# Patient Record
Sex: FEMALE | URBAN - METROPOLITAN AREA
[De-identification: names, ages, dates, MRNs, and addresses within clinical notes are randomized per-mention and may not be internally consistent; named-entity substitution may affect disease eponyms.]

---

## 2018-08-06 ENCOUNTER — COMMUNICATION - HEALTHEAST (OUTPATIENT)
Dept: OTHER | Facility: CLINIC | Age: 28
End: 2018-08-06

## 2019-10-21 ENCOUNTER — MEDICAL CORRESPONDENCE (OUTPATIENT)
Dept: HEALTH INFORMATION MANAGEMENT | Facility: CLINIC | Age: 29
End: 2019-10-21

## 2019-10-23 ENCOUNTER — TRANSFERRED RECORDS (OUTPATIENT)
Dept: HEALTH INFORMATION MANAGEMENT | Facility: CLINIC | Age: 29
End: 2019-10-23

## 2019-10-28 ENCOUNTER — HOSPITAL ENCOUNTER (OUTPATIENT)
Dept: CARDIOLOGY | Facility: CLINIC | Age: 29
Discharge: HOME | End: 2019-10-28
Admitting: PEDIATRICS
Payer: COMMERCIAL

## 2019-10-28 DIAGNOSIS — O26.90 PREGNANCY RELATED CONDITION, ANTEPARTUM: ICD-10-CM

## 2019-10-28 PROCEDURE — 76825 ECHO EXAM OF FETAL HEART: CPT

## 2019-10-28 NOTE — PROGRESS NOTES
Fetal Cardiology Consultation    Patient:  Brendan Magaña MRN:  [id number]   YOB: 1990 Age:  29 year old   Date of Visit:  10/28/2019 PCP:  Jaylyn Meeks, Physician   LATIA: 19 EGA: 35+1 weeks     Dear Doctor:    I had the pleasure of seeing Brendan Magaña at the [facility] in [place] on 10/28/2019 in consultation for fetal echocardiography results. She presented today by herself. As you know, she is a 29 year old female with suspected fetal cardiac anomaly on obstetrical ultrasound.    The fetal echocardiogram was normal. Normal fetal cardiac anatomy. Normal right and left ventricular size and function without hypertrophy. No evidence of diastolic dysfunction. No pericardial effusion. No arrhythmia.     I reviewed and interpreted the fetal echocardiogram today. I discussed the normal results with Ms. Magaña. While these results are normal, it is important to note that fetal echocardiography cannot exclude small atrial or ventricular septal defects, persistent ductus arteriosus, mild coarctation of the aorta, partial anomalous pulmonary venous return, minor anatomic valve anomalies, or coronary artery anomalies.     -- No additional fetal echocardiograms are recommended for this pregnancy.  -- A post-katherine transthoracic echocardiogram is recommended to exclude a subtle VSD.    Thank you for allowing me to participate in Ms. Magaña's care. Please don't hesitate to contact me or the Fetal Cardiology team at [facility] with any questions or concerns.     I spent a total of 10 minutes face-to-face with Ms. Magaña during today's office visit. Over 50% of this time was spent counseling the patient and/or coordinating care regarding the fetal echocardiography results.     Marc Payne MD  Pediatric Cardiology  [facility]  Phone [phone]

## 2020-01-28 ENCOUNTER — TRANSFERRED RECORDS (OUTPATIENT)
Dept: HEALTH INFORMATION MANAGEMENT | Facility: CLINIC | Age: 30
End: 2020-01-28

## 2020-01-28 ENCOUNTER — TRANSCRIBE ORDERS (OUTPATIENT)
Dept: OTHER | Age: 30
End: 2020-01-28

## 2020-01-28 ENCOUNTER — TELEPHONE (OUTPATIENT)
Dept: DERMATOLOGY | Facility: CLINIC | Age: 30
End: 2020-01-28

## 2020-01-28 DIAGNOSIS — L73.2 HIDRADENITIS SUPPURATIVA: Primary | ICD-10-CM

## 2020-01-28 NOTE — TELEPHONE ENCOUNTER
[facility] Call Center    Phone Message    May a detailed message be left on voicemail: yes    Reason for Call: Other: Pt was referred to see Dr Xiao for Hidradenitis Suppurativa but the earliest appt is on 4/30.  Please assist Pt to check for earlier date.     Action Taken: Message routed to:  [facility] ([facility] dermatology

## 2020-01-29 NOTE — TELEPHONE ENCOUNTER
Called pt and LVM. I wanted to let her know that the 4/30/20 appointment with Dr. Xiao is the soonest available with him. I wanted to see if she wanted to see another one of our providers in the meantime. Clinic number provided.  CYNTHIA Núñez

## 2020-01-31 NOTE — TELEPHONE ENCOUNTER
FUTURE VISIT INFORMATION      FUTURE VISIT INFORMATION:    Date: 4.30.20    Time: 2:45    Location:  Derm  REFERRAL INFORMATION:    Referring provider:  Dr. Mian Ruiz    Referring providers clinic:  [facility]    Reason for visit/diagnosis  new Pt referred by  Mian Ruiz MD for Hidradenitis suppurativa    RECORDS REQUESTED FROM:       Clinic name Comments Records Status Imaging Status   [facility] 1.28.20 Dr. Ruiz   Requested          [facility] 12.23.19 Dr. Cierra Sage Epic/CE N/A                       Action 3.27.20   Action Taken Faxed records request to [facility] at [phone]

## 2020-02-03 NOTE — TELEPHONE ENCOUNTER
I called the patient and I left a message asking that she call back.   [facility]

## 2020-04-27 ENCOUNTER — MYC MEDICAL ADVICE (OUTPATIENT)
Dept: DERMATOLOGY | Facility: CLINIC | Age: 30
End: 2020-04-27

## 2020-04-27 NOTE — TELEPHONE ENCOUNTER
Called to set up [employer] for video visit. Pt accepted but still needs to logon to [employer]. Pt will do this and we will send the video visit information:     Thank you for scheduling your visit as a Video Visit! To receive an invitation and connect with your provider, the Native video visit technology must be accessible from your smartphone or personal device. Please click the link below for setup instructions:    [email or web address]    Pt will call back with concerns.

## 2020-04-30 ENCOUNTER — PRE VISIT (OUTPATIENT)
Dept: DERMATOLOGY | Facility: CLINIC | Age: 30
End: 2020-04-30

## 2020-12-20 ENCOUNTER — HEALTH MAINTENANCE LETTER (OUTPATIENT)
Age: 30
End: 2020-12-20

## 2021-10-03 ENCOUNTER — HEALTH MAINTENANCE LETTER (OUTPATIENT)
Age: 31
End: 2021-10-03

## 2023-11-20 ENCOUNTER — TRANSCRIBE ORDERS (OUTPATIENT)
Dept: OTHER | Age: 33
End: 2023-11-20

## 2023-11-20 DIAGNOSIS — L73.2 HIDRADENITIS SUPPURATIVA: Primary | ICD-10-CM

## 2023-12-10 ENCOUNTER — HEALTH MAINTENANCE LETTER (OUTPATIENT)
Age: 33
End: 2023-12-10

## 2024-03-28 ENCOUNTER — OFFICE VISIT (OUTPATIENT)
Dept: DERMATOLOGY | Facility: CLINIC | Age: 34
End: 2024-03-28
Attending: DERMATOLOGY
Payer: COMMERCIAL

## 2024-03-28 ENCOUNTER — LAB (OUTPATIENT)
Dept: LAB | Facility: CLINIC | Age: 34
End: 2024-03-28
Payer: COMMERCIAL

## 2024-03-28 VITALS
TEMPERATURE: 98.1 F | HEART RATE: 94 BPM | DIASTOLIC BLOOD PRESSURE: 72 MMHG | OXYGEN SATURATION: 98 % | SYSTOLIC BLOOD PRESSURE: 106 MMHG | WEIGHT: 220.1 LBS

## 2024-03-28 DIAGNOSIS — L73.2 HIDRADENITIS SUPPURATIVA: ICD-10-CM

## 2024-03-28 LAB
HBV CORE AB SERPL QL IA: NONREACTIVE
HBV SURFACE AB SERPL IA-ACNC: 12.1 M[IU]/ML
HBV SURFACE AB SERPL IA-ACNC: REACTIVE M[IU]/ML
HBV SURFACE AG SERPL QL IA: NONREACTIVE
HCV AB SERPL QL IA: NONREACTIVE
VIT D+METAB SERPL-MCNC: 13 NG/ML (ref 20–50)

## 2024-03-28 PROCEDURE — 87340 HEPATITIS B SURFACE AG IA: CPT | Performed by: DERMATOLOGY

## 2024-03-28 PROCEDURE — 86803 HEPATITIS C AB TEST: CPT | Performed by: DERMATOLOGY

## 2024-03-28 PROCEDURE — 99000 SPECIMEN HANDLING OFFICE-LAB: CPT | Performed by: PATHOLOGY

## 2024-03-28 PROCEDURE — 250N000011 HC RX IP 250 OP 636: Performed by: DERMATOLOGY

## 2024-03-28 PROCEDURE — 90472 IMMUNIZATION ADMIN EACH ADD: CPT | Performed by: DERMATOLOGY

## 2024-03-28 PROCEDURE — 84630 ASSAY OF ZINC: CPT | Mod: 90 | Performed by: PATHOLOGY

## 2024-03-28 PROCEDURE — 250N000021 HC RX MED A9270 GY (STAT IND- M) 250: Performed by: DERMATOLOGY

## 2024-03-28 PROCEDURE — G0463 HOSPITAL OUTPT CLINIC VISIT: HCPCS | Mod: 25 | Performed by: DERMATOLOGY

## 2024-03-28 PROCEDURE — 90750 HZV VACC RECOMBINANT IM: CPT | Performed by: DERMATOLOGY

## 2024-03-28 PROCEDURE — G0009 ADMIN PNEUMOCOCCAL VACCINE: HCPCS | Performed by: DERMATOLOGY

## 2024-03-28 PROCEDURE — 36415 COLL VENOUS BLD VENIPUNCTURE: CPT | Performed by: PATHOLOGY

## 2024-03-28 PROCEDURE — 90677 PCV20 VACCINE IM: CPT | Performed by: DERMATOLOGY

## 2024-03-28 PROCEDURE — 99204 OFFICE O/P NEW MOD 45 MIN: CPT | Performed by: DERMATOLOGY

## 2024-03-28 PROCEDURE — 86704 HEP B CORE ANTIBODY TOTAL: CPT | Performed by: DERMATOLOGY

## 2024-03-28 PROCEDURE — 86706 HEP B SURFACE ANTIBODY: CPT | Performed by: DERMATOLOGY

## 2024-03-28 PROCEDURE — 82306 VITAMIN D 25 HYDROXY: CPT | Performed by: DERMATOLOGY

## 2024-03-28 RX ORDER — ACETAMINOPHEN 325 MG/1
650 TABLET ORAL ONCE
Status: CANCELLED
Start: 2024-04-04 | End: 2024-04-04

## 2024-03-28 RX ORDER — DIPHENHYDRAMINE HYDROCHLORIDE 50 MG/ML
50 INJECTION INTRAMUSCULAR; INTRAVENOUS
Status: CANCELLED
Start: 2024-04-04

## 2024-03-28 RX ORDER — ALBUTEROL SULFATE 0.83 MG/ML
2.5 SOLUTION RESPIRATORY (INHALATION)
Status: CANCELLED | OUTPATIENT
Start: 2024-04-04

## 2024-03-28 RX ORDER — MEPERIDINE HYDROCHLORIDE 25 MG/ML
25 INJECTION INTRAMUSCULAR; INTRAVENOUS; SUBCUTANEOUS EVERY 30 MIN PRN
Status: CANCELLED | OUTPATIENT
Start: 2024-04-04

## 2024-03-28 RX ORDER — CLOBETASOL PROPIONATE 0.5 MG/G
CREAM TOPICAL 2 TIMES DAILY
Qty: 60 G | Refills: 0 | Status: SHIPPED | OUTPATIENT
Start: 2024-03-28 | End: 2024-07-15

## 2024-03-28 RX ORDER — DIPHENHYDRAMINE HCL 25 MG
25 CAPSULE ORAL ONCE
Status: CANCELLED
Start: 2024-04-04 | End: 2024-04-04

## 2024-03-28 RX ORDER — EPINEPHRINE 1 MG/ML
0.3 INJECTION, SOLUTION, CONCENTRATE INTRAVENOUS EVERY 5 MIN PRN
Status: CANCELLED | OUTPATIENT
Start: 2024-04-04

## 2024-03-28 RX ORDER — ALBUTEROL SULFATE 90 UG/1
1-2 AEROSOL, METERED RESPIRATORY (INHALATION)
Status: CANCELLED
Start: 2024-04-04

## 2024-03-28 RX ORDER — HEPARIN SODIUM,PORCINE 10 UNIT/ML
5-20 VIAL (ML) INTRAVENOUS DAILY PRN
Status: CANCELLED | OUTPATIENT
Start: 2024-04-04

## 2024-03-28 RX ORDER — FERROUS SULFATE 325(65) MG
325 TABLET, DELAYED RELEASE (ENTERIC COATED) ORAL
COMMUNITY
Start: 2024-03-22

## 2024-03-28 RX ORDER — LACTOBACILLUS RHAMNOSUS GG 10B CELL
1 CAPSULE ORAL DAILY
COMMUNITY
End: 2024-07-15

## 2024-03-28 RX ORDER — METHYLPREDNISOLONE SODIUM SUCCINATE 40 MG/ML
40 INJECTION, POWDER, LYOPHILIZED, FOR SOLUTION INTRAMUSCULAR; INTRAVENOUS ONCE
Status: CANCELLED | OUTPATIENT
Start: 2024-04-04

## 2024-03-28 RX ORDER — METHYLPREDNISOLONE SODIUM SUCCINATE 125 MG/2ML
125 INJECTION, POWDER, LYOPHILIZED, FOR SOLUTION INTRAMUSCULAR; INTRAVENOUS
Status: CANCELLED
Start: 2024-04-04

## 2024-03-28 RX ORDER — HEPARIN SODIUM (PORCINE) LOCK FLUSH IV SOLN 100 UNIT/ML 100 UNIT/ML
5 SOLUTION INTRAVENOUS
Status: CANCELLED | OUTPATIENT
Start: 2024-04-04

## 2024-03-28 RX ORDER — LEVOTHYROXINE SODIUM 137 UG/1
137 TABLET ORAL
COMMUNITY
Start: 2024-03-08

## 2024-03-28 RX ORDER — TRAZODONE HYDROCHLORIDE 50 MG/1
50-100 TABLET, FILM COATED ORAL
COMMUNITY
Start: 2023-12-08 | End: 2024-07-15

## 2024-03-28 RX ADMIN — ZOSTER VACCINE RECOMBINANT, ADJUVANTED 0.5 ML: KIT at 12:05

## 2024-03-28 RX ADMIN — PNEUMOCOCCAL 20-VALENT CONJUGATE VACCINE 0.5 ML
2.2; 2.2; 2.2; 2.2; 2.2; 2.2; 2.2; 2.2; 2.2; 2.2; 2.2; 2.2; 2.2; 2.2; 2.2; 2.2; 4.4; 2.2; 2.2; 2.2 INJECTION, SUSPENSION INTRAMUSCULAR at 12:03

## 2024-03-28 NOTE — PATIENT INSTRUCTIONS
HS PLAN  - Continue humira until 1 week prior to infusion  - Start infliximab as soon as you can  - Continue hibilcens washes  - Restart clindamycin 300mg twice a day   - Clobetasol cream twice a day to flaring lesions    HIDRADENITIS SUPPURATIVA     What is hidradenitis suppurativa (HS)?  Hidradenitis suppurativa (HS) is a chronic skin disorder affecting the hair follicles. The disease starts with sore red lumps (or boils), typically involving the armpits, breasts, lower abdomen, groin, and buttocks. These lesions appear suddenly, increase in size and then burst or rupture, usually under the surface of the skin. This causes severe pain. Sometimes they rupture to the surface of the skin, draining pus. In some people, they can result in tunnels under the skin that may or may not continue to drain. When the lumps heal, they can leave scars.     Facts:   HS is a chronic skin disease, that comes and goes in flares, and is very painful  HS happens in many people - men and women, young and elderly, all races and ethnicities. But HS is more common in young adults, women  and skin of color  One out of three people with HS has a family member with HS   HS is NOT due to an infection or washing habits  HS is NOT contagious, so it cannot be spread from one person to another person   HS is NOT caused by how well you wash or what soaps you use  HS is NOT caused by smoking or obesity, but quitting smoking and losing weight have helped some people        Causes  The cause of HS remains unclear. It is thought that there is a problem in the hair follicle that makes it weaker and easier to break open. The current theory is that there are three steps that cause an HS lesion to form::   The hair follicle gets plugged   The hair follicle swells and then ruptures, causing pain and inflammation   In some people, the inflammation does not stop and results in tunneling through the skin       Associated Conditions  HS is associated with  several other medical conditions and activities including:  Tobacco use  High cholesterol, heart disease and stroke   Type 2 diabetes   Depression and anxiety   Arthritis, which causes stiffness and pain in joints   Inflammatory bowel disease (including Crohn's disease and ulcerative colitis)  Severe acne and pilonidal sinus/cyst  Polycystic ovarian syndrome  Skin cancer in areas of longstanding HS lesions, typically in the groin or buttocks (rare)    It is important to ask your physician to watch out for these conditions.      To help manage mental health issues related to HS and emotional support:  Help finding a mental health specialist: https://www.psychologytoday.com/us/therapists  Suicide prevention (24/7 free confidential support):   Phone: (871) 819-7865  Website: https://suicidepreventionlifeline.org/    Support groups:    Hope for HS: www.hopeforhs.org  HS Connect: www.[facility].org    HS Patient Support Application for your phone: Papaya (developed in collaboration with patient and community HS advocacy groups)    Intimacy support: American Association of Sexuality Educators, Counselors and Therapists (AASECT) website: https://www.aasect.org      For help quitting smoking   Phone:  English: 8-213-PCFQ-NOW (1-962.145.6311)  Syrian: 1-125-MIOAXM-YA (1-496.425.7463)    Website:   English: https://smokefree.gov/  Syrian: [place].smokefree.gov     Lifestyle Modifications   Quitting smoking or weight loss can help your overall health and may help improve HS symptoms in some people, but not everyone.   It is recommended to eat a well-balanced, healthy diet (https://health.gov/dietaryguidelines) and to maintain a healthy weight. Avoiding dietary triggers can help some people suffering with HS, but will not necessarily help others with HS.    Some people may find that friction, irritation, and rubbing may worsen HS symptoms. Some people do better with loose, breathable clothing and fabrics. Shaving close to the  affected areas may also worsen HS in some people. But, not everyone has the same triggers for their HS, so see what works for you!    Some medications may worsen HS such as lithium, testosterone, and some progesterone-only birth control.  Please discuss this with your provider before stopping medication.     Zinc supplementation has been shown in some studies to help HS. However, every treatment can have a side effects,  so talk to your provider before starting any treatment.     Treatment                    Medicines, procedures and surgeries are offered to treat HS. Treatment can help reduce breakouts and improve quality of life.  Medicines used:  Topical washes (e.g. chlorhexidine, benzoyl peroxide)    Clindamycin on the skin - seems effective for pustules and papules. Probably not helpful for larger chronic lesions.     Oral antibiotics (e.g. doxycycline, clindamycin + rifampin, dapsone) - antibiotics may help some, but not all patients    Hormonal therapies (e.g. spironolactone, oral contraceptives) - primarily used in females though to have a hormonal component to their HS (e.g perimenstrual flares, worsening in pregnancy, polycystic ovarian syndrome)    Immunosuppression (e.g. prednisone)     Injectables (e.g. adalimumab, secukinumab, infliximab) - Adalimumab and Secukinumab are the only federal drug administration (FDA) approved medication for HS  Education for adalimumab injections: https://www.[employer].com/humira-complete/injection  Adalimumab abassador program:   Website: https://www.[employer]/humira-complete/sign-up/  Phone: 1.985.3XJWXES (1.551.832.5448)    Procedures:  Intralesional steroid injections - may help areas of acute active inflammation     Some hair removal lasers - If considering this option, we recommend doing this only with a medical professional that has experience treating HS.     Surgeries:  Incision and drainage - Provides fast, short-term relief, but symptoms likely to recur.         Deroofing - This surgery involves opening the lesion and cleaning out the base. This treatment is effective for recurring lesions.  Recurrence rates seem to be similar to excision. These are typically left open and allowed to heal on their own over 1-3 months      Excision - This involves cutting out chronic lesions.  This may entail cutting out a large affected area referred to as wide local excision, or cutting out single lesions, referred to as localized excisions.  Excision may be done with a scalpel, electric heating device or laser (e.g. carbon dioxide [CO2] laser).  These are either allowed to heal on their own, closed with sutures, or closed with a graft or flap.  Grafts are typically done by taking skin from one site of the body and using it to close another part of the body.  Flaps are done by moving tissue around to close a wound.     Check out this website to help decide the best treatment options for you!     https://www.informed-decisions.org/hidradenitispda.php

## 2024-03-28 NOTE — PROGRESS NOTES
Screenings  - Depression/Anxiety: +,  - Sexual dysfunction: unknown  - Tobacco use: None  - PCOS: Normal  - Obesity: +  - DMII or Insulin Resistance: DMII, tried metformin in October.  She started having stomach pain and diarrhea. Stopped when hopsitalized in Jan with presumed sepsis with lactic acidosis.  - Hypertension: No  - Hyperlipidemia: No  - Inflammatory bowel disease: No  - Spondyloarthropathy: No    11/16/2023 labs:   - quantiferon,     Recent A1c wnl 5.1    11/29/23: Iron deciency --> had multiple iron infusions last few weeks.    No shingles or pneumonia vaccines    HIV neg 2019  Hep B surface ab 2019

## 2024-03-28 NOTE — LETTER
3/28/2024       RE: Brendan Magaña  [address] [place] [address]     Dear Colleague,    Thank you for referring your patient, Brendan Magaña, to the [facility] [place] at [facility]. Please see a copy of my visit note below.    [facility] Note  Encounter Date: Mar 28, 2024  Office Visit     Dermatology Problem List:  1. Hidradenitis suppurativa  ________________________________    Assessment & Plan:  # Hidradenitis suppurativa. Detailed discussion of etiology and nature of condition, along with risks and benefits of treatment options. Treatment options include antibiotics, anti-hormonals, immunosuppressives, non-immunosuppressive anti-inflammatories, procedures (LHR, Botox), diet and lifestyle changes (cutting out milk, zavaleta's yeast, Mediterranean diet, intermittent fasting) along with nutritional counseling, and topical pain medication for flares. Clinical trials are also an option.   - Continue Humira until 1 week prior to infusion  - Start infliximab as soon as possible  - Restart clindamycin 300 mg twice a day  - Shingles and pnuemonia vaccines given today  - Labs today: Hep B/C, vit D, zinc  Flare Plan:  - Start clobetasol cream twice a day to flaring lesions     Wound Care Order Documentation    Patient presents for evaluation and treatment of hidradenitis suppurativa (ICD 10: L73.2).  The wound it neither surgical/debrided or related to a burn or pressure injury.  Stage: Full thickness    The patient is in need of wound care and dressing changes for management and healing of their current wound(s).     Name: Brendan Magaña  : 1990  INSURER: Payor: [employer] / Plan: [employer] [place] CARE / Product Type: HMO /   Policy ID#:  [id number]        3/28/2024    10:14 AM   Bandage Order/Info   Wound Location (Other #1) Back   Wound Location (Other #1) - Comments Upper back   Wound Size (Other #1)  LxWxD  5x4.5x3mm   Drainage Amount (Other #1) Heavy   Primary Bandage (Other #1) Mepilex with border   Bandage Size (Other #1) 4x4 in   Change Freq (Other #1) BID   Wound Location (Other #2) Back   Wound Location (Other #2) - Comments Mid Back   Wound Size (Other #2) LxWxD  8x2.5x2mm   Drainage Amount (Other #2) Heavy   Primary Bandage (Other #2) Mepilex with border   Bandage Size (Other #2) 4x4 in   Change Freq (Other #2) BID   30 day  need = 120  90 day supply needed    Follow-up: 12 weeks via phone    Staff and Scribe:     Scribe Disclosure:   I, Romy Edward, am serving as a scribe; to document services personally performed by Lex Xiao MD -based on data collection and the provider's statements to me.     Provider Disclosure:  I agree with above History, Review of Systems, Physical exam and Plan.  I have reviewed the content of the documentation and have edited it as needed. I have personally performed the services documented here and the documentation accurately represents those services and the decisions I have made.      Electronically signed by:    Provider Disclosure:   The documentation recorded by the scribe accurately reflects the services I personally performed and the decisions made by me.    Lex Xiao MD, FAAD    Departments of Internal Medicine and Dermatology  [facility]    ________________________________    CC: Consult (HS)    HPI:  Ms. Brendan Magaña is a(n) 33 year old female who presents today as a new patient for HS.  She was diagnosed with Hs in high school. She is taking Humira, has been on it since December. Humira has not helped has much. She was given Humira by Dr. Jack. Last seen by Dr. Jack was in January 2024. Was taking oral medication, which she feels has helped (clinda/rifampin) for 10 weeks, and started the humira at the same time. She feels that the combo (clinda/rifampin/humira) did help. When she stopped the clinda/rifampin, she  "started flaring.    She is taking zoloft for depression. She denies any harm to herself.  \"She does not like her family to ask how she is doing, she feels they are nagging\"  Reports that she had reaction to metformin in february, was admitted to the hospital and given blood transfusion. She stated that metformin was causing stomach pain and diarrhea in 10/2023. Was told that is normal  She reports, she has regular bowel movements.  Personal Hx: diabetes  No tobacco use  Menstrual cycle: normal  High blood pressure: none  High cholesterol: none    No shingles or pneumonia vaccine    Screenings  - Depression/Anxiety: +,  - Sexual dysfunction: unknown  - Tobacco use: None  - PCOS: Normal  - Obesity: +  - DMII or Insulin Resistance: DMII, tried metformin in October.  She started having stomach pain and diarrhea. Stopped when hopsitalized in Jan with presumed sepsis with lactic acidosis.  - Hypertension: No  - Hyperlipidemia: No  - Inflammatory bowel disease: No  - Spondyloarthropathy: No      HS Nurse Assessment        3/28/2024     9:04 AM   Nurse Assessment Data   Over the past 30 days how many old lesions flared back up? Always active   Over the past 30 days how many new lesions did you get? 0   Over the past week, how many dressing changes do you do each day? 3+   Over the past week, has your wound drainage been: Mild   Rate your HS overall from 0 - 10 (0 = no disease, 10 = worst) over the past week:  7-8   Rate your pain score from 0 - 10 (0 = no disease, 10 = worst) for the most painful/symptomatic lesion in the past week:  7   Over the past week, how much has HS influenced your quality of life? very much     Patient is otherwise feeling well, without additional skin concerns.     Physical Exam:  Vitals: /72   Pulse 94   Temp 98.1  F (36.7  C) (Oral)   Wt 99.8 kg (220 lb 1.6 oz)   SpO2 98%   SKIN: Full skin, which includes the head/face, both arms, chest, back, abdomen,both legs, genitalia and/or groin " buttocks, digits and/or nails, was examined.  - Left axilla Post inflammatory- no active lesions of HS  - Back-5 non draining tunnels  - Right/Left thigh-No active lesions of HS. Hopkins 2  - Groin area-No active lesions of HS. Hopkins 2  - upper back 5x4.5 with 3mm depth  - Mid back 8x2.5 dept of 2mm  - No other lesions of concern on areas examined.     Medications:  Current Outpatient Medications   Medication    ferrous sulfate (FE TABS) 325 (65 Fe) MG EC tablet    lactobacillus rhamnosus, GG, (CULTURELL) capsule    levothyroxine (SYNTHROID/LEVOTHROID) 137 MCG tablet    sertraline (ZOLOFT) 50 MG tablet    traZODone (DESYREL) 50 MG tablet     No current facility-administered medications for this visit.      Labs:  11/16/2023 labs:   - quantiferon,   Recent A1c wnl 5.1  11/29/23: Iron deciency --> had multiple iron infusions last few weeks.  HIV neg 2019  Hep B surface ab 2019    PMH  Iron  deficiency anemia  Hypothyroidism (acquired)  DM type 2  Depression     CC Macey Jack MD  [address]  [address]  [place] [address] on close of this encounter.     DME (Durable Medical Equipment) Orders and Documentation  Orders Placed This Encounter   Procedures    Wound Care Order        The patient was assessed and it was determined the patient is in need of the following listed DME Supplies/Equipment. Please complete supporting documentation below to demonstrate medical necessity.

## 2024-03-28 NOTE — PROGRESS NOTES
[facility] Dermatology Note  Encounter Date: Mar 28, 2024  Office Visit     Dermatology Problem List:  1. Hidradenitis suppurativa  ________________________________    Assessment & Plan:  # Hidradenitis suppurativa. Detailed discussion of etiology and nature of condition, along with risks and benefits of treatment options. Treatment options include antibiotics, anti-hormonals, immunosuppressives, non-immunosuppressive anti-inflammatories, procedures (LHR, Botox), diet and lifestyle changes (cutting out milk, zavaleta's yeast, Mediterranean diet, intermittent fasting) along with nutritional counseling, and topical pain medication for flares. Clinical trials are also an option.   - Continue Humira until 1 week prior to infusion  - Start infliximab as soon as possible  - Restart clindamycin 300 mg twice a day  - Shingles and pnuemonia vaccines given today  - Labs today: Hep B/C, vit D, zinc  Flare Plan:  - Start clobetasol cream twice a day to flaring lesions     Wound Care Order Documentation    Patient presents for evaluation and treatment of hidradenitis suppurativa (ICD 10: L73.2).  The wound it neither surgical/debrided or related to a burn or pressure injury.  Stage: Full thickness    The patient is in need of wound care and dressing changes for management and healing of their current wound(s).     Name: Brendan Magaña  : 1990  INSURER: Payor: [employer] / Plan: [facility] [place] CARE / Product Type: HMO /   Policy ID#:  [id number]        3/28/2024    10:14 AM   Bandage Order/Info   Wound Location (Other #1) Back   Wound Location (Other #1) - Comments Upper back   Wound Size (Other #1) LxWxD  5x4.5x3mm   Drainage Amount (Other #1) Heavy   Primary Bandage (Other #1) Mepilex with border   Bandage Size (Other #1) 4x4 in   Change Freq (Other #1) BID   Wound Location (Other #2) Back   Wound Location (Other #2) - Comments Mid Back   Wound Size (Other #2) LxWxD  8x2.5x2mm   Drainage Amount  "(Other #2) Heavy   Primary Bandage (Other #2) Mepilex with border   Bandage Size (Other #2) 4x4 in   Change Freq (Other #2) BID   30 day  need = 120  90 day supply needed    Follow-up: 12 weeks via phone    Staff and Scribe:     Scribe Disclosure:   I, Romy Edward, am serving as a scribe; to document services personally performed by Lex Xiao MD -based on data collection and the provider's statements to me.     Provider Disclosure:  I agree with above History, Review of Systems, Physical exam and Plan.  I have reviewed the content of the documentation and have edited it as needed. I have personally performed the services documented here and the documentation accurately represents those services and the decisions I have made.      Electronically signed by:    Provider Disclosure:   The documentation recorded by the scribe accurately reflects the services I personally performed and the decisions made by me.    Lex Xiao MD, FAAD    Departments of Internal Medicine and Dermatology  [facility]    ________________________________    CC: Consult (HS)    HPI:  Ms. Brendan Magaña is a(n) 33 year old female who presents today as a new patient for HS.  She was diagnosed with Hs in high school. She is taking Humira, has been on it since December. Humira has not helped has much. She was given Humira by Dr. Jack. Last seen by Dr. Jack was in January 2024. Was taking oral medication, which she feels has helped (clinda/rifampin) for 10 weeks, and started the humira at the same time. She feels that the combo (clinda/rifampin/humira) did help. When she stopped the clinda/rifampin, she started flaring.    She is taking zoloft for depression. She denies any harm to herself.  \"She does not like her family to ask how she is doing, she feels they are nagging\"  Reports that she had reaction to metformin in february, was admitted to the hospital and given blood transfusion. She stated that " metformin was causing stomach pain and diarrhea in 10/2023. Was told that is normal  She reports, she has regular bowel movements.  Personal Hx: diabetes  No tobacco use  Menstrual cycle: normal  High blood pressure: none  High cholesterol: none    No shingles or pneumonia vaccine    Screenings  - Depression/Anxiety: +,  - Sexual dysfunction: unknown  - Tobacco use: None  - PCOS: Normal  - Obesity: +  - DMII or Insulin Resistance: DMII, tried metformin in October.  She started having stomach pain and diarrhea. Stopped when hopsitalized in Jan with presumed sepsis with lactic acidosis.  - Hypertension: No  - Hyperlipidemia: No  - Inflammatory bowel disease: No  - Spondyloarthropathy: No      HS Nurse Assessment        3/28/2024     9:04 AM   Nurse Assessment Data   Over the past 30 days how many old lesions flared back up? Always active   Over the past 30 days how many new lesions did you get? 0   Over the past week, how many dressing changes do you do each day? 3+   Over the past week, has your wound drainage been: Mild   Rate your HS overall from 0 - 10 (0 = no disease, 10 = worst) over the past week:  7-8   Rate your pain score from 0 - 10 (0 = no disease, 10 = worst) for the most painful/symptomatic lesion in the past week:  7   Over the past week, how much has HS influenced your quality of life? very much     Patient is otherwise feeling well, without additional skin concerns.     Physical Exam:  Vitals: /72   Pulse 94   Temp 98.1  F (36.7  C) (Oral)   Wt 99.8 kg (220 lb 1.6 oz)   SpO2 98%   SKIN: Full skin, which includes the head/face, both arms, chest, back, abdomen,both legs, genitalia and/or groin buttocks, digits and/or nails, was examined.  - Left axilla Post inflammatory- no active lesions of HS  - Back-5 non draining tunnels  - Right/Left thigh-No active lesions of HS. Hopkins 2  - Groin area-No active lesions of HS. Hopkins 2  - upper back 5x4.5 with 3mm depth  - Mid back 8x2.5 dept of 2mm  -  No other lesions of concern on areas examined.     Medications:  Current Outpatient Medications   Medication    ferrous sulfate (FE TABS) 325 (65 Fe) MG EC tablet    lactobacillus rhamnosus, GG, (CULTURELL) capsule    levothyroxine (SYNTHROID/LEVOTHROID) 137 MCG tablet    sertraline (ZOLOFT) 50 MG tablet    traZODone (DESYREL) 50 MG tablet     No current facility-administered medications for this visit.      Labs:  11/16/2023 labs:   - quantiferon,   Recent A1c wnl 5.1  11/29/23: Iron deciency --> had multiple iron infusions last few weeks.  HIV neg 2019  Hep B surface ab 2019    PMH  Iron  deficiency anemia  Hypothyroidism (acquired)  DM type 2  Depression     CC Macey Jack MD  [address]  [address]  [place] [address] on close of this encounter.     DME (Durable Medical Equipment) Orders and Documentation  Orders Placed This Encounter   Procedures    Wound Care Order        The patient was assessed and it was determined the patient is in need of the following listed DME Supplies/Equipment. Please complete supporting documentation below to demonstrate medical necessity.

## 2024-03-29 LAB — ZINC SERPL-MCNC: 57.6 UG/DL

## 2024-04-04 DIAGNOSIS — E60 ZINC DEFICIENCY: ICD-10-CM

## 2024-04-04 DIAGNOSIS — L73.2 HIDRADENITIS SUPPURATIVA: Primary | ICD-10-CM

## 2024-04-04 DIAGNOSIS — E55.9 VITAMIN D DEFICIENCY: ICD-10-CM

## 2024-04-04 RX ORDER — CHOLECALCIFEROL (VITAMIN D3) 50 MCG
1 TABLET ORAL DAILY
Qty: 90 TABLET | Refills: 3 | Status: SHIPPED | OUTPATIENT
Start: 2024-04-04

## 2024-04-04 RX ORDER — ZINC GLUCONATE 50 MG
100 TABLET ORAL DAILY
Qty: 180 TABLET | Refills: 0 | Status: SHIPPED | OUTPATIENT
Start: 2024-04-04

## 2024-07-15 ENCOUNTER — VIRTUAL VISIT (OUTPATIENT)
Dept: DERMATOLOGY | Facility: CLINIC | Age: 34
End: 2024-07-15
Attending: DERMATOLOGY
Payer: COMMERCIAL

## 2024-07-15 ENCOUNTER — MYC MEDICAL ADVICE (OUTPATIENT)
Dept: DERMATOLOGY | Facility: CLINIC | Age: 34
End: 2024-07-15
Payer: COMMERCIAL

## 2024-07-15 DIAGNOSIS — L73.2 HIDRADENITIS SUPPURATIVA: ICD-10-CM

## 2024-07-15 DIAGNOSIS — D84.821 IMMUNOSUPPRESSION DUE TO DRUG THERAPY (H): Primary | ICD-10-CM

## 2024-07-15 DIAGNOSIS — Z79.899 IMMUNOSUPPRESSION DUE TO DRUG THERAPY (H): Primary | ICD-10-CM

## 2024-07-15 RX ORDER — CLINDAMYCIN HCL 300 MG
300 CAPSULE ORAL 2 TIMES DAILY
Qty: 60 CAPSULE | Refills: 2 | Status: SHIPPED | OUTPATIENT
Start: 2024-07-15

## 2024-07-15 RX ORDER — ADALIMUMAB 40MG/0.4ML
40 KIT SUBCUTANEOUS
COMMUNITY
Start: 2024-03-20

## 2024-07-15 NOTE — Clinical Note
FYI - you saw her back in March & plan was to stop Humira & start infliximab ASAP. She never started this & continues to have chronic severe flares. I spoke with her about process for getting infusions started & covered by insurance -- will try to get this started through [facility]. We may need to transfer our to [facility] if insurance requires she receive here (back up plan). She sees you next on 8/29/24   Brenda

## 2024-07-15 NOTE — PROGRESS NOTES
Medication Therapy Management (MTM) Encounter    ASSESSMENT:                            Hidradenitis suppurativa:   Chronic severe flares, despite treatment with Humira 40 mg weekly. Dermatology recommended initiation of infliximab infusions in replacement of Humira at last appointment in March but this was never started. Reviewed instructions for stopping Humira 1 week prior to first infliximab infusion and plan for getting infliximab infusions started as soon as possible. Will proceed with [facility] infusion center for now & switch to [facility] if insurance requires. Advised she restart oral clindamycin as recommended by Dr. Xiao to help in interim with flare.   - Pre-biologic labs: previously completed   - Safety labs: baseline completed, may repeat CBC & CMP around every 3 months with infusions  - Vaccines: Avoid live vaccines. Due for 2nd Shingrix vaccine and Covid-19 vaccine (2023-24)    Hypothyroidism:   Recently restarted levothryoxine. Reviewed this is typically a lifelong treatment to maintain thyroid levels.     Vitamin D Deficiency/Iron Deficiency Anemia:   Completed high dose Vit D supplementation & continues on low daily dose. Recommend we recheck Vit D level in around 4-8 weeks. Iron & ferritin levels within normal limits after completion of IV iron infusions, will continue to follow with Hematology at [facility].     PLAN:                            Continue Humira 40 mg weekly for now. Stop 1 week prior to first infliximab infusion    Restart oral clindamycin 1 capsule (300 mg) twice daily. Sent updated Rx     Patient scheduled infusion appointment at [facility] for 8/2/24 to serve as placeholder to start coverage process    Will provide updates to patient is appointment needs to be pushed out  Spoke with [facility] - would need orders sent to an [facility] if planning to receive here   Will keep this as back up plan if insurance requires patient receive here vs [facility]     Schedule  vaccine appointment at [facility] to receive 2nd (and final) Shingrix vaccine (sent Rx order for this)     Dermatology appointment: with Dr. Xiao scheduled for 8/29/24      Follow-up: via [employer] message with updates on coverage of Infliximab and with me (Brenda Carr, [facility]) for an MT follow up appointment by phone around 3 months after Infliximab initiation to review response     SUBJECTIVE/OBJECTIVE:                          Brendan Magaña is a 34 year old female called for an initial visit. She was referred to me from Dr. Lex Xiao MD.      Reason for visit: assistance with infliximab infusions.    Medication Adherence/Access:   Patient thinks insurance may be contracted with [facility] as in-network she she is not certain if she will need to receive infusions there instead   Infliximab coverage:   -  Process not started    Hidradenitis suppurativa:   - Humira (adalimumab) 40 mg weekly (will stop 1 wk prior to infliximab)   - Infliximab infusion 10 mg/kg on Week 0, 2, then every 4 weeks (initiation pending coverage)  - clindamycin 300 mg twice daily (has not restarted)   - Chlorhexidine 4% wash daily as needed   - Zinc gluconate 100 mg (2 tablets) once daily   No current side effects reported.     Patient was supposed to switch over to infliximab infusions a few months ago but reports she was not sure where coverage was at or how to proceed with initiation. She has continued on Humira but reports she continues to develop new acute lesions & that chronic HS abscesses constantly flares. Never restarted antibiotic - clindamycin/rifampin previously helped some. Affected areas include axilla, thighs, groin, back.    Specialist: Dr. Lex Xiao MD, Dermatology. Hopkins Stage: II. Last visit on 3/28/24. The following was recommended:   - Continue Humira until 1 week prior to infusion. Start infliximab as soon as possible  - Restart clindamycin 300 mg twice a day     Previous treatment:   - Humira (adalimumab) 40 mg  weekly (12/2023 - Present) - continues to chronically flares  - Metformin: severe GI side effects   - clobetasol 0.05% ointment as needed (inflamed HS lesions, stopped by patient)     Pre-Biologic Screening:   Hep B Surface Antibody Reactive, indicates immunity. (3/28/24)   Hep B Core Antibody  Non-reactive (3/28/24)   Hep B Surface Antigen Non-reactive  (3/28/24)   Hep C Antibody  Non-reactive  (3/28/24)   HIV Antigen Antibody Non-reactive  (5/29/19)     Quantiferon TB Gold Negative  Last checked: 11/16/23  No reported risk factors or symptoms concerning for TB infection.     CBC (last checked 4/23/24): Hgb slightly low  BMP (last checked 4/23/24): eGFR >90 mL/min, potassium low (supplement?), AST/ALP/AlkP within normal limits     Immunization History   Covid-19 vaccine ([id number] version)  Due to receive   Influenza (annual) Consider vaccine in 2024-25 season, avoid live FluMist   Pneumococcal  Prevnar-20: 3/28/24 Up-to-date   Tetanus/Tdap  Up-to-date   Shingrix (1st dose: 3/28/24) Due for 2nd dose   All patients on biologics should avoid live vaccines (varicella/VZV, intranasal influenza, MMR, or yellow fever vaccine (if traveling))       Hypothyroidism:   - Levothyroxine 137 mcg daily  Patient reports she was off mistakenly off this medication for some time but that she recently restarted.     Vitamin D Deficiency/Iron Deficiency Anemia:   - Vitamin D3 2000 units daily   No current side effects.   Notes that she completed 8 weeks of high dose Vit D supplementation & now takes daily low dose. Reports she stopped oral iron supplement as iron levels have been normal following IV iron infusions.     Lab Results   Component Value Date    VITDT 13 (L) 03/28/2024     Lab Results from [facility] (4/23/24)   - Vitamin B12: 503 pg/mL (normal)   - Ferritin: 130 ng/mL (normal)   - Iron: 68 ug/dL (normal)   - UIBC: 197 ug/dL (normal)   - Iron binding capacity: 265 ug/dL (normal)   - Iron saturation: 26% (normal)      Specialist: Lety Aponte NP, Hematology. Last visit 5/31/24. Had a total of 5 iron infusions for 1000mg from 2/23/24-3/2/24. Labs show that the IV iron replenish her iron stores at this time.     Allergies/ADRs: Reviewed in chart  Past Medical History: Reviewed in chart  Tobacco: She reports that she has never smoked. She has never used smokeless tobacco.  Alcohol: Reviewed in chart    ----------------    I spent 25 minutes with this patient today. All changes were made via collaborative practice agreement with Dr. Lex Xiao. A copy of the visit note was provided to the patient's provider(s).    A summary of these recommendations was sent via [employer].    Brenda Carr, PharmD  Medication Therapy Management Pharmacist   [facility]     Telemedicine Visit Details  Type of service:  Telephone visit  Start Time:  9:00am  End Time:  9:25am     Medication Therapy Recommendations  No medication therapy recommendations to display

## 2024-07-15 NOTE — Clinical Note
7/15/2024       RE: Brendan Magaña  [address] [place] [address]     Dear Colleague,    Thank you for referring your patient, Brendan Magaña, to the [facility] RHEUMATOLOGY [facility]IN[facility] [place] at [facility]. Please see a copy of my visit note below.    Medication Therapy Management (MTM) Encounter    ASSESSMENT:                            Hidradenitis suppurativa:   Improving, with initiation of ***. Patient would benefit from ***.   Flaring, despite treatment with ***. Dermatology recommended initiation of ***.   Provided education on *** today including dosing, administration, side effects, precautions, monitoring, and time to efficacy. Baseline labs (*** completed, needs to complete): *** CBC, CMP, fasting lipid panel. Repeat safety labs (***) in *** months.   Addressed avoiding live vaccines and encouraged receiving the following non-live vaccines: *** Covid-19 vaccine (2023-24), annual influenza, Shingrix (*** 2 dose series), Prevnar 20 (***), Tetanus booster (>10 years since last booster), RSV vaccine (in late summer/early fall).   Pre-biologic labs *** previously completed / need to be completed. Risk factors for exposure, due for annual Quant TB screening.     Pain:   ***    Supplements:   ***    PLAN:                            Continue Humira 40 mg weekly for now. Stop 1 week prior to first infliximab infusion   Call [facility] to coordinate plan for initiation of infliximab as soon as possible   Restart oral clindamycin 1 capsule (300 mg) twice daily. Sent updated Rx   Schedule vaccine appointment at [facility] to receive 2nd (and final) Shingrix vaccine - sent Rx order for this to pharmacy     Dermatology appointment: with  *** in around *** weeks (***)     Follow-up: {Derm MTM follow up options:881166}     SUBJECTIVE/OBJECTIVE:                          Brendan Magaña is a 34 year old female called for an initial visit. She was referred to me from  Dr. Lex Xiao MD.      Reason for visit: assistance with infliximab infusions.    Medication Adherence/Access:   *** coverage:   - {PA status (Optional):351462}    Plans to receive infusions at [facility] -     Hidradenitis suppurativa:   - Humira (adalimumab) 40 mg weekly (***)  - Infliximab infusion *** mg/kg *** (***)  - clindamycin 300 mg twice daily (has not restarted)   - Chlorhexidine 4% wash daily as needed   - Zinc gluconate 100 mg (2 tablets) once daily   Side effects: ***.    Patient reports {HS Symptoms:111294:x}.   Affected areas include {HS Affected Areas:433524}.  Outside records indicate patient is still refilling Humira. States she has still been taking this continues experience chronic active flares & has developed new acute lesions.     Specialist: Dr. Lex Xiao MD, Dermatology. [place] Stage: ***. Last visit on ***. The following was recommended:   - ***    Previous treatment:   - Humira (adalimumab) 40 mg weekly (12/2023 - ***) - ***  - Metformin: severe GI side effects   - clobetasol 0.05% ointment as needed (inflamed HS lesions, stopped by patient)   {Previous HS Medications:302534}    Pre-Biologic Screening: ***  Hep B Surface Antibody Reactive, indicates immunity. (3/28/24)   Hep B Core Antibody  Non-reactive (3/28/24)   Hep B Surface Antigen Non-reactive  (3/28/24)   Hep C Antibody  Non-reactive  (3/28/24)   HIV Antigen Antibody Non-reactive  (5/29/19)     Quantiferon TB Gold Negative  Last checked: 11/16/23  No reported risk factors or symptoms concerning for TB infection.     CBC (last checked ***): ***  CMP (last checked ***): eGFR *** mL/min, liver enzymes ***    Immunization History   Covid-19 vaccine (8459-3993 version)  Due to receive   Influenza (annual) Consider vaccine in 2024-25 season, avoid live FluMist   Pneumococcal  Prevnar-20: 3/28/24 Up-to-date   Tetanus/Tdap  Up-to-date   Shingrix (1st dose: 3/28/24) Due for 2nd dose   All patients on biologics should avoid  "live vaccines (varicella/VZV, intranasal influenza, MMR, or yellow fever vaccine (if traveling))       Hypothyroidism:   - Levothyroxine 137 mcg daily  Patient is having the following symptoms: {hypo/hyperthyroid:102174}.     Vitamin D Deficiency:   - Vitamin D3 2000 units daily     {supplement:995718}    Lab Results   Component Value Date    VITDT 13 (L) 03/28/2024       Allergies/ADRs: Reviewed in chart  Past Medical History: Reviewed in chart  Tobacco: She reports that she has never smoked. She has never used smokeless tobacco.  Alcohol: Reviewed in chart ***   ----------------  {MOHSEN?:172042}  I spent {mtm total time 3:813385} with this patient today. All changes were made via collaborative practice agreement with Dr. Lex Xiao. A copy of the visit note was provided to the patient's provider(s).    A summary of these recommendations {GIVEN/NOT GIVEN:535647}.    ***    Telemedicine Visit Details  Type of service:  {telemedvisitmtm:454628::\"Telephone visit\"}  Start Time: {video/phone visit start time:152948}  End Time: {video/phone visit end time:152948}     Medication Therapy Recommendations  No medication therapy recommendations to display     Medication Therapy Management (MTM) Encounter    ASSESSMENT:                            Hidradenitis suppurativa:   Chronic severe flares, despite treatment with Humira 40 mg weekly. Dermatology recommended initiation of infliximab infusions in replacement of Humira at last appointment in March but this was never started. Reviewed instructions for stopping Humira 1 week prior to first infliximab infusion and plan for getting infliximab infusions started as soon as possible. Will proceed with [facility] infusion center for now & switch to [facility] if insurance requires. Advised she restart oral clindamycin as recommended by Dr. Xiao to help in interim with flare.   - Pre-biologic labs: previously completed   - Safety labs: baseline completed, may repeat CBC & CMP around " every 3 months with infusions  - Vaccines: Avoid live vaccines. Due for 2nd Shingrix vaccine and Covid-19 vaccine (2023-24)    Hypothyroidism:   Recently restarted levothryoxine. Reviewed this is typically a lifelong treatment to maintain thyroid levels.     Vitamin D Deficiency/Iron Deficiency Anemia:   Completed high dose Vit D supplementation & continues on low daily dose. Recommend we recheck Vit D level in around 4-8 weeks. Iron & ferritin levels within normal limits after completion of IV iron infusions, will continue to follow with Hematology at [facility].     PLAN:                            Continue Humira 40 mg weekly for now. Stop 1 week prior to first infliximab infusion    Restart oral clindamycin 1 capsule (300 mg) twice daily. Sent updated Rx     Patient scheduled infusion appointment at [facility] for 8/2/24 to serve as placeholder to start coverage process    Will provide updates to patient is appointment needs to be pushed out    Spoke with [facility] specialty pharmacy & patient would need orders to go to an [facility] instead.   Will keep as back up plan if insurance requires patient to receive here vs [facility]     Patient to schedule vaccine appointment at [facility] to receive 2nd (and final) Shingrix vaccine - sent Rx order for this to pharmacy     Dermatology appointment: with Dr. Xiao scheduled for 8/29/24      Follow-up: via [employer] message with updates on coverage of Infliximab and with me (Brenda Carr [facility]) for an MTM follow up appointment by phone around 3 months after Infliximab initiation to review response     SUBJECTIVE/OBJECTIVE:                          Brendan Magaña is a 34 year old female called for an initial visit. She was referred to me from Dr. Lex Xiao MD.      Reason for visit: assistance with infliximab infusions.    Medication Adherence/Access:   Patient thinks insurance may be contracted with [facility] as in-network she she is not certain if she  will need to receive infusions there instead   Infliximab coverage:   -  Process not started    Hidradenitis suppurativa:   - Humira (adalimumab) 40 mg weekly (will stop 1 wk prior to infliximab)   - Infliximab infusion 10 mg/kg on Week 0, 2, then every 4 weeks (initiation pending coverage)  - clindamycin 300 mg twice daily (has not restarted)   - Chlorhexidine 4% wash daily as needed   - Zinc gluconate 100 mg (2 tablets) once daily   No current side effects reported.     Patient was supposed to switch over to infliximab infusions a few months ago but reports she was not sure where coverage was at or how to proceed with initiation. She has continued on Humira but reports she continues to develop new acute lesions & that chronic HS abscesses constantly flares. Never restarted antibiotic - clindamycin/rifampin previously helped some. Affected areas include axilla, thighs, groin, back.    Specialist: Dr. Lex Xiao MD, Dermatology. [place] Stage: II. Last visit on 3/28/24. The following was recommended:   - Continue Humira until 1 week prior to infusion. Start infliximab as soon as possible  - Restart clindamycin 300 mg twice a day     Previous treatment:   - Humira (adalimumab) 40 mg weekly (12/2023 - Present) - continues to chronically flares  - Metformin: severe GI side effects   - clobetasol 0.05% ointment as needed (inflamed HS lesions, stopped by patient)     Pre-Biologic Screening:   Hep B Surface Antibody Reactive, indicates immunity. (3/28/24)   Hep B Core Antibody  Non-reactive (3/28/24)   Hep B Surface Antigen Non-reactive  (3/28/24)   Hep C Antibody  Non-reactive  (3/28/24)   HIV Antigen Antibody Non-reactive  (5/29/19)     Quantiferon TB Gold Negative  Last checked: 11/16/23  No reported risk factors or symptoms concerning for TB infection.     CBC (last checked 4/23/24): Hgb slightly low  BMP (last checked 4/23/24): eGFR >90 mL/min, potassium low (supplement?), AST/ALP/AlkP within normal limits      Immunization History   Covid-19 vaccine ([id number] version)  Due to receive   Influenza (annual) Consider vaccine in 2024-25 season, avoid live FluMist   Pneumococcal  Prevnar-20: 3/28/24 Up-to-date   Tetanus/Tdap  Up-to-date   Shingrix (1st dose: 3/28/24) Due for 2nd dose   All patients on biologics should avoid live vaccines (varicella/VZV, intranasal influenza, MMR, or yellow fever vaccine (if traveling))       Hypothyroidism:   - Levothyroxine 137 mcg daily  Patient reports she was off mistakenly off this medication for some time but that she recently restarted.     Vitamin D Deficiency/Iron Deficiency Anemia:   - Vitamin D3 2000 units daily   No current side effects.   Notes that she completed 8 weeks of high dose Vit D supplementation & now takes daily low dose. Reports she stopped oral iron supplement as iron levels have been normal following IV iron infusions.     Lab Results   Component Value Date    VITDT 13 (L) 03/28/2024     Lab Results from [facility] (4/23/24)   - Vitamin B12: 503 pg/mL (normal)   - Ferritin: 130 ng/mL (normal)   - Iron: 68 ug/dL (normal)   - UIBC: 197 ug/dL (normal)   - Iron binding capacity: 265 ug/dL (normal)   - Iron saturation: 26% (normal)     Specialist: Lety Aponte NP, Hematology. Last visit 5/31/24. Had a total of 5 iron infusions for 1000mg from 2/23/24-3/2/24. Labs show that the IV iron replenish her iron stores at this time.     Allergies/ADRs: Reviewed in chart  Past Medical History: Reviewed in chart  Tobacco: She reports that she has never smoked. She has never used smokeless tobacco.  Alcohol: Reviewed in chart    ----------------    I spent 25 minutes with this patient today. All changes were made via collaborative practice agreement with Dr. Lex Xiao. A copy of the visit note was provided to the patient's provider(s).    A summary of these recommendations was sent via [employer].    Brenda Carr, PharmD  Medication Therapy Management Pharmacist    [employer]  [facility]     Telemedicine Visit Details  Type of service:  Telephone visit  Start Time:  9:00am  End Time:  9:25am     Medication Therapy Recommendations  No medication therapy recommendations to display       Again, thank you for allowing me to participate in the care of your patient.      Sincerely,    Brenda Carr RPH

## 2024-07-17 NOTE — PATIENT INSTRUCTIONS
"Recommendations from today's MTM visit:                                                      Continue Humira 40 mg weekly for now. You will discontinue this 1 week before your first infliximab infusion     Restart oral clindamycin 1 capsule (300 mg) twice daily. This should help some with your current flare. I send an updated prescription for this to [facility]    You are currently scheduled for an infusion appointment at [facility] on 8/2/24   This appointment serve as placeholder to prompt the infusion finance team to start the coverage process for infliximab     As we get closer to infusion date I will provide updates to you if your infusion appointment needs to be pushed out because coverage is still being worked on     I spoke with [facility] specialty pharmacy and they informed me we could send orders for infliximab to [facility] if needed.   We will keep as back up plan if insurance requires you to receive this with [facility]     Please schedule a vaccine appointment at [facility] to receive 2nd (and final) Shingrix vaccine.   I sent a prescription order for this to your pharmacy so they know to give this to you     Dermatology appointment: with Dr. Xiao scheduled for 8/29/24      Follow-up: via [email or web address] message with updates on coverage of Infliximab and with me (Brenda Carr, [facility]) for an MTM follow up appointment by phone around 3 months after Infliximab initiation to review response     It was great speaking with you today.  I value your experience and would be very thankful for your time in providing feedback in our clinic survey. In the next few days, you may receive an email or text message from [employer] with a link to a survey related to your  clinical pharmacist.\"     To schedule another MTM appointment, please call the clinic directly or you may call the MTM scheduling line at [phone] or toll-free at [phone]. "     My Clinical Pharmacist's contact information:                                                      Please feel free to contact me with any questions or concerns you have.      Brenda Carr, PharmD  MTM Pharmacist  [facility]

## 2024-08-02 ENCOUNTER — INFUSION THERAPY VISIT (OUTPATIENT)
Dept: INFUSION THERAPY | Facility: CLINIC | Age: 34
End: 2024-08-02
Attending: DERMATOLOGY
Payer: COMMERCIAL

## 2024-08-02 VITALS
TEMPERATURE: 97.7 F | RESPIRATION RATE: 16 BRPM | DIASTOLIC BLOOD PRESSURE: 65 MMHG | WEIGHT: 197.6 LBS | HEART RATE: 87 BPM | OXYGEN SATURATION: 100 % | SYSTOLIC BLOOD PRESSURE: 92 MMHG

## 2024-08-02 DIAGNOSIS — L73.2 HIDRADENITIS SUPPURATIVA: Primary | ICD-10-CM

## 2024-08-02 LAB
ALBUMIN SERPL BCG-MCNC: 3.8 G/DL (ref 3.5–5.2)
ALP SERPL-CCNC: 85 U/L (ref 40–150)
ALT SERPL W P-5'-P-CCNC: 12 U/L (ref 0–50)
ANION GAP SERPL CALCULATED.3IONS-SCNC: 12 MMOL/L (ref 7–15)
AST SERPL W P-5'-P-CCNC: 23 U/L (ref 0–45)
BASOPHILS # BLD AUTO: 0 10E3/UL (ref 0–0.2)
BASOPHILS NFR BLD AUTO: 0 %
BILIRUB SERPL-MCNC: 0.7 MG/DL
BUN SERPL-MCNC: 5.3 MG/DL (ref 6–20)
CALCIUM SERPL-MCNC: 9.4 MG/DL (ref 8.8–10.4)
CHLORIDE SERPL-SCNC: 104 MMOL/L (ref 98–107)
CREAT SERPL-MCNC: 0.62 MG/DL (ref 0.51–0.95)
EGFRCR SERPLBLD CKD-EPI 2021: >90 ML/MIN/1.73M2
EOSINOPHIL # BLD AUTO: 0.1 10E3/UL (ref 0–0.7)
EOSINOPHIL NFR BLD AUTO: 2 %
ERYTHROCYTE [DISTWIDTH] IN BLOOD BY AUTOMATED COUNT: 13.2 % (ref 10–15)
GLUCOSE SERPL-MCNC: 174 MG/DL (ref 70–99)
HCO3 SERPL-SCNC: 22 MMOL/L (ref 22–29)
HCT VFR BLD AUTO: 30.8 % (ref 35–47)
HGB BLD-MCNC: 10 G/DL (ref 11.7–15.7)
IMM GRANULOCYTES # BLD: 0 10E3/UL
IMM GRANULOCYTES NFR BLD: 0 %
LYMPHOCYTES # BLD AUTO: 1.1 10E3/UL (ref 0.8–5.3)
LYMPHOCYTES NFR BLD AUTO: 14 %
MCH RBC QN AUTO: 27.5 PG (ref 26.5–33)
MCHC RBC AUTO-ENTMCNC: 32.5 G/DL (ref 31.5–36.5)
MCV RBC AUTO: 85 FL (ref 78–100)
MONOCYTES # BLD AUTO: 0.3 10E3/UL (ref 0–1.3)
MONOCYTES NFR BLD AUTO: 4 %
NEUTROPHILS # BLD AUTO: 5.8 10E3/UL (ref 1.6–8.3)
NEUTROPHILS NFR BLD AUTO: 80 %
NRBC # BLD AUTO: 0 10E3/UL
NRBC BLD AUTO-RTO: 0 /100
PLATELET # BLD AUTO: 228 10E3/UL (ref 150–450)
POTASSIUM SERPL-SCNC: 3.1 MMOL/L (ref 3.4–5.3)
PROT SERPL-MCNC: 8.8 G/DL (ref 6.4–8.3)
RBC # BLD AUTO: 3.64 10E6/UL (ref 3.8–5.2)
SODIUM SERPL-SCNC: 138 MMOL/L (ref 135–145)
WBC # BLD AUTO: 7.3 10E3/UL (ref 4–11)

## 2024-08-02 PROCEDURE — 96413 CHEMO IV INFUSION 1 HR: CPT

## 2024-08-02 PROCEDURE — 36415 COLL VENOUS BLD VENIPUNCTURE: CPT | Performed by: DERMATOLOGY

## 2024-08-02 PROCEDURE — 82040 ASSAY OF SERUM ALBUMIN: CPT | Performed by: DERMATOLOGY

## 2024-08-02 PROCEDURE — 85025 COMPLETE CBC W/AUTO DIFF WBC: CPT | Performed by: DERMATOLOGY

## 2024-08-02 PROCEDURE — 258N000003 HC RX IP 258 OP 636: Performed by: DERMATOLOGY

## 2024-08-02 PROCEDURE — 250N000011 HC RX IP 250 OP 636: Performed by: DERMATOLOGY

## 2024-08-02 PROCEDURE — 96415 CHEMO IV INFUSION ADDL HR: CPT

## 2024-08-02 RX ORDER — ALBUTEROL SULFATE 0.83 MG/ML
2.5 SOLUTION RESPIRATORY (INHALATION)
Status: CANCELLED | OUTPATIENT
Start: 2024-08-16

## 2024-08-02 RX ORDER — HEPARIN SODIUM,PORCINE 10 UNIT/ML
5-20 VIAL (ML) INTRAVENOUS DAILY PRN
Status: CANCELLED | OUTPATIENT
Start: 2024-08-16

## 2024-08-02 RX ORDER — DIPHENHYDRAMINE HYDROCHLORIDE 50 MG/ML
50 INJECTION INTRAMUSCULAR; INTRAVENOUS
Status: CANCELLED
Start: 2024-08-16

## 2024-08-02 RX ORDER — DIPHENHYDRAMINE HCL 25 MG
25 CAPSULE ORAL ONCE
Status: CANCELLED
Start: 2024-08-16 | End: 2024-08-16

## 2024-08-02 RX ORDER — METHYLPREDNISOLONE SODIUM SUCCINATE 40 MG/ML
40 INJECTION, POWDER, LYOPHILIZED, FOR SOLUTION INTRAMUSCULAR; INTRAVENOUS ONCE
Status: CANCELLED | OUTPATIENT
Start: 2024-08-16

## 2024-08-02 RX ORDER — ACETAMINOPHEN 325 MG/1
650 TABLET ORAL ONCE
Status: CANCELLED
Start: 2024-08-16 | End: 2024-08-16

## 2024-08-02 RX ORDER — ALBUTEROL SULFATE 90 UG/1
1-2 AEROSOL, METERED RESPIRATORY (INHALATION)
Status: CANCELLED
Start: 2024-08-16

## 2024-08-02 RX ORDER — EPINEPHRINE 1 MG/ML
0.3 INJECTION, SOLUTION INTRAMUSCULAR; SUBCUTANEOUS EVERY 5 MIN PRN
Status: CANCELLED | OUTPATIENT
Start: 2024-08-16

## 2024-08-02 RX ORDER — HEPARIN SODIUM (PORCINE) LOCK FLUSH IV SOLN 100 UNIT/ML 100 UNIT/ML
5 SOLUTION INTRAVENOUS
Status: CANCELLED | OUTPATIENT
Start: 2024-08-16

## 2024-08-02 RX ORDER — METHYLPREDNISOLONE SODIUM SUCCINATE 125 MG/2ML
125 INJECTION, POWDER, LYOPHILIZED, FOR SOLUTION INTRAMUSCULAR; INTRAVENOUS
Status: CANCELLED
Start: 2024-08-16

## 2024-08-02 RX ADMIN — SODIUM CHLORIDE 900 MG: 9 INJECTION, SOLUTION INTRAVENOUS at 11:15

## 2024-08-02 NOTE — PATIENT INSTRUCTIONS
Dear Brendan Magaña    Thank you for choosing [facility] ([facility] for your infusion.  The following information is a summary of our appointment as well as important reminders.      EDUCATION POST BIOLOGICAL/CHEMOTHERAPY INFUSION  Call the triage nurse at your clinic or seek medical attention if you have chills and/or temperature greater than or equal to 100.5, uncontrolled nausea/vomiting, diarrhea, constipation, dizziness, shortness of breath, chest pain, heart palpitations, weakness or any other new or concerning symptoms, questions or concerns.  You can not have any live virus vaccines prior to or during treatment or up to 6 months post infusion.  If you have an upcoming surgery, medical procedure or dental procedure during treatment, this should be discussed with your ordering physician and your surgeon/dentist.  If you are having any concerning symptom, if you are unsure if you should get your next infusion or wish to speak to a provider before your next infusion, please call your care coordinator or triage nurse at your clinic to notify them so we can adequately serve you.     If you have any questions on your upcoming Specialty Infusion appointments, please call scheduling at [phone].  It was a pleasure taking care of you today.    Sincerely,    [facility]  [address] [place]  [address]  Phone:  [phone]

## 2024-08-02 NOTE — PROGRESS NOTES
Chief Complaint   Patient presents with    Infusion     IV Inflectra     Infusion Nursing Note:  Brendan Magaña presents today for IV  Inflectra, first dose.    Patient seen by provider today: No   present during visit today: Not Applicable.    Note:   First dose education given.  Inflectra infused over 2 hours.      Intravenous Access:  Peripheral IV placed.  Labs drawn without difficulty.    Treatment Conditions:  HBV negative 3/28/24  Tb negative 11/16/23  Vitals taken Q 30 minutes during infusion.    Biological Infusion Checklist:  ~~~ NOTE: If the patient answers yes to any of the questions below, hold the infusion and contact ordering provider or on-call provider.    Have you recently had an elevated temperature, fever, chills, productive cough, coughing for 3 weeks or longer or hemoptysis,  abnormal vital signs, night sweats,  chest pain or have you noticed a decrease in your appetite, unexplained weight loss or fatigue? No  Do you have any open wounds or new incisions? Yes, multiple HS wounds to groin and buttocks. No s/s of infection. Care team aware.  Do you have any upcoming hospitalizations or surgeries? Does not include esophagogastroduodenoscopy, colonoscopy, endoscopic retrograde cholangiopancreatography (ERCP), endoscopic ultrasound (EUS), dental procedures or joint aspiration/steroid injections No  Do you currently have any signs of illness or infection or are you on any antibiotics? No  Have you had any new, sudden or worsening abdominal pain? No  Have you or anyone in your household received a live vaccination in the past 4 weeks? Please note: No live vaccines while on biologic/chemotherapy until 6 months after the last treatment. Patient can receive the flu vaccine (shot only), pneumovax and the Covid vaccine. It is optimal for the patient to get these vaccines mid cycle, but they can be given at any time as long as it is not on the day of the infusion. No  Have you recently been diagnosed  with any new nervous system diseases (ie. Multiple sclerosis, Guillain Gibsonia, seizures, neurological changes) or cancer diagnosis? Are you on any form of radiation or chemotherapy? No  Are you pregnant or breast feeding or do you have plans of pregnancy in the future? No  Have you been having any signs of worsening depression or suicidal ideations?  (benlysta only) No  Have there been any other new onset medical symptoms? No  Have you had any new blood clots? (IVIG only) No    Post Infusion Assessment:  Patient tolerated infusion without incident.  Blood return noted pre and post infusion.  Site patent and intact, free from redness, edema or discomfort.  No evidence of extravasations.  Access discontinued per protocol.     POST-INFUSION OF BIOLOGICAL MEDICATION:  Reviewed with patient.  Given biologic medication or medication hand-out. Inform patient if any fever, chills or signs of infection, new symptoms, abdominal pain, heart palpitations, shortness of breath, reaction, weakness, neurological changes, seek medical attention immediately and should not receive infusions. No live virus vaccines prior to or during treatment or up to 6 months post infusion. If the patient has an upcoming procedure or surgery, this should be discussed with the rheumatologist and surgeon or provider.     Discharge Plan:   AVS to patient via [employer].  Patient will return 8/16 for next appointment.   Patient discharged in stable condition accompanied by: .  Departure Mode: Ambulatory.    Administrations This Visit       inFLIXimab-dyyb (INFLECTRA) 900 mg in sodium chloride 0.9 % 275 mL infusion       Admin Date  08/02/2024 Action  $New Bag Dose  900 mg Rate  137.5 mL/hr Route  Intravenous Documented By  Dung Sheehan, RN                  Vital signs:  Temp: 97.7  F (36.5  C) Temp src: Oral BP: 92/69 Pulse: 94   Resp: 18 SpO2: 98 % O2 Device: None (Room air)     Weight: 89.6 kg (197 lb 9.6 oz) (intentional weight loss)  There is  no height or weight on file to calculate BMI.

## 2024-08-16 ENCOUNTER — INFUSION THERAPY VISIT (OUTPATIENT)
Dept: INFUSION THERAPY | Facility: CLINIC | Age: 34
End: 2024-08-16
Attending: DERMATOLOGY
Payer: COMMERCIAL

## 2024-08-16 VITALS
TEMPERATURE: 98.3 F | SYSTOLIC BLOOD PRESSURE: 103 MMHG | DIASTOLIC BLOOD PRESSURE: 70 MMHG | RESPIRATION RATE: 18 BRPM | OXYGEN SATURATION: 99 % | WEIGHT: 198.2 LBS | HEART RATE: 83 BPM

## 2024-08-16 DIAGNOSIS — L73.2 HIDRADENITIS SUPPURATIVA: Primary | ICD-10-CM

## 2024-08-16 PROCEDURE — 258N000003 HC RX IP 258 OP 636: Performed by: DERMATOLOGY

## 2024-08-16 PROCEDURE — 96413 CHEMO IV INFUSION 1 HR: CPT

## 2024-08-16 PROCEDURE — 250N000011 HC RX IP 250 OP 636: Performed by: DERMATOLOGY

## 2024-08-16 PROCEDURE — 96415 CHEMO IV INFUSION ADDL HR: CPT

## 2024-08-16 RX ORDER — DIPHENHYDRAMINE HYDROCHLORIDE 50 MG/ML
50 INJECTION INTRAMUSCULAR; INTRAVENOUS
Status: CANCELLED
Start: 2024-09-13

## 2024-08-16 RX ORDER — ALBUTEROL SULFATE 0.83 MG/ML
2.5 SOLUTION RESPIRATORY (INHALATION)
Status: CANCELLED | OUTPATIENT
Start: 2024-09-13

## 2024-08-16 RX ORDER — HEPARIN SODIUM,PORCINE 10 UNIT/ML
5-20 VIAL (ML) INTRAVENOUS DAILY PRN
Status: CANCELLED | OUTPATIENT
Start: 2024-09-13

## 2024-08-16 RX ORDER — HEPARIN SODIUM (PORCINE) LOCK FLUSH IV SOLN 100 UNIT/ML 100 UNIT/ML
5 SOLUTION INTRAVENOUS
Status: CANCELLED | OUTPATIENT
Start: 2024-09-13

## 2024-08-16 RX ORDER — DIPHENHYDRAMINE HCL 25 MG
25 CAPSULE ORAL ONCE
Status: CANCELLED
Start: 2024-09-13 | End: 2024-09-13

## 2024-08-16 RX ORDER — METHYLPREDNISOLONE SODIUM SUCCINATE 125 MG/2ML
125 INJECTION, POWDER, LYOPHILIZED, FOR SOLUTION INTRAMUSCULAR; INTRAVENOUS
Status: CANCELLED
Start: 2024-09-13

## 2024-08-16 RX ORDER — EPINEPHRINE 1 MG/ML
0.3 INJECTION, SOLUTION INTRAMUSCULAR; SUBCUTANEOUS EVERY 5 MIN PRN
Status: CANCELLED | OUTPATIENT
Start: 2024-09-13

## 2024-08-16 RX ORDER — METHYLPREDNISOLONE SODIUM SUCCINATE 40 MG/ML
40 INJECTION, POWDER, LYOPHILIZED, FOR SOLUTION INTRAMUSCULAR; INTRAVENOUS ONCE
Status: CANCELLED | OUTPATIENT
Start: 2024-09-13

## 2024-08-16 RX ORDER — ACETAMINOPHEN 325 MG/1
650 TABLET ORAL ONCE
Status: CANCELLED
Start: 2024-09-13 | End: 2024-09-13

## 2024-08-16 RX ORDER — ALBUTEROL SULFATE 90 UG/1
1-2 AEROSOL, METERED RESPIRATORY (INHALATION)
Status: CANCELLED
Start: 2024-09-13

## 2024-08-16 RX ADMIN — SODIUM CHLORIDE 900 MG: 9 INJECTION, SOLUTION INTRAVENOUS at 10:42

## 2024-08-16 ASSESSMENT — PAIN SCALES - GENERAL: PAINLEVEL: NO PAIN (0)

## 2024-08-16 NOTE — PATIENT INSTRUCTIONS
Dear Brendan Magaña    Thank you for choosing [facility] ([facility] for your infusion.  The following information is a summary of our appointment as well as important reminders.      EDUCATION POST BIOLOGICAL/CHEMOTHERAPY INFUSION  Call the triage nurse at your clinic or seek medical attention if you have chills and/or temperature greater than or equal to 100.5, uncontrolled nausea/vomiting, diarrhea, constipation, dizziness, shortness of breath, chest pain, heart palpitations, weakness or any other new or concerning symptoms, questions or concerns.  You can not have any live virus vaccines prior to or during treatment or up to 6 months post infusion.  If you have an upcoming surgery, medical procedure or dental procedure during treatment, this should be discussed with your ordering physician and your surgeon/dentist.  If you are having any concerning symptom, if you are unsure if you should get your next infusion or wish to speak to a provider before your next infusion, please call your care coordinator or triage nurse at your clinic to notify them so we can adequately serve you.   If you are a transplant patient and require transplant education, please click on this link: [email or web address].    If you have any questions on your upcoming Specialty Infusion appointments, please call scheduling at [phone].  It was a pleasure taking care of you today.    Sincerely,    [facility]  [facility]fusion [facility]  [address] [place] [address]  [id number]  Phone:  [phone]

## 2024-08-16 NOTE — PROGRESS NOTES
Infusion Nursing Note:  Brendan Magaña presents today for infliximab.    Patient seen by provider today: No   present during visit today: Not Applicable.    Note: Pt here for second dose infliximab. Pt reports that the HS lesions on her back seem to be bleeding less and starting to decrease in size after her infusion 2 weeks abo. Infliximab infused over 2 hours per orders.    Intravenous Access:  Peripheral IV placed.    Treatment Conditions:  Biological Infusion Checklist:  ~~~ NOTE: If the patient answers yes to any of the questions below, hold the infusion and contact ordering provider or on-call provider.    Have you recently had an elevated temperature, fever, chills, productive cough, coughing for 3 weeks or longer or hemoptysis,  abnormal vital signs, night sweats,  chest pain or have you noticed a decrease in your appetite, unexplained weight loss or fatigue? No  Do you have any open wounds or new incisions? Open wounds on back - unchanged from previous  Do you have any upcoming hospitalizations or surgeries? Does not include esophagogastroduodenoscopy, colonoscopy, endoscopic retrograde cholangiopancreatography (ERCP), endoscopic ultrasound (EUS), dental procedures or joint aspiration/steroid injections No  Do you currently have any signs of illness or infection or are you on any antibiotics? Yes, on long term clindamycin for HS  Have you had any new, sudden or worsening abdominal pain? No  Have you or anyone in your household received a live vaccination in the past 4 weeks? Please note: No live vaccines while on biologic/chemotherapy until 6 months after the last treatment. Patient can receive the flu vaccine (shot only), pneumovax and the Covid vaccine. It is optimal for the patient to get these vaccines mid cycle, but they can be given at any time as long as it is not on the day of the infusion. No  Have you recently been diagnosed with any new nervous system diseases (ie. Multiple sclerosis,  Guillain [place], seizures, neurological changes) or cancer diagnosis? Are you on any form of radiation or chemotherapy? No  Are you pregnant or breast feeding or do you have plans of pregnancy in the future? No  Have you been having any signs of worsening depression or suicidal ideations?  (benlysta only) NA  Have there been any other new onset medical symptoms? No  Have you had any new blood clots? (IVIG only) NA  Post Infusion Assessment:  Patient tolerated infusion without incident.  Site patent and intact, free from redness, edema or discomfort.  Access discontinued per protocol.   Msg sent to scheduling to get additional appointments scheduled.     Discharge Plan:   Patient and/or family verbalized understanding of discharge instructions and all questions answered.  Patient discharged in stable condition accompanied by: .  Departure Mode: Ambulatory.    Administrations This Visit       inFLIXimab-dyyb (INFLECTRA) 900 mg in sodium chloride 0.9 % 275 mL infusion       Admin Date  08/16/2024 Action  $New Bag Dose  900 mg Rate  137.5 mL/hr Route  Intravenous Documented By  Laurita Alfaro RN                  /72 (BP Location: Left arm, Patient Position: Sitting, Cuff Size: Adult Regular)   Pulse 77   Temp 98.3  F (36.8  C) (Oral)   Resp 18   Wt 89.9 kg (198 lb 3.2 oz)   SpO2 99%     Laurita Alfaro RN

## 2024-08-21 DIAGNOSIS — E87.6 LOW SERUM POTASSIUM: Primary | ICD-10-CM

## 2024-08-21 RX ORDER — POTASSIUM CHLORIDE 1.5 G/1.58G
40 POWDER, FOR SOLUTION ORAL 2 TIMES DAILY
Qty: 2 PACKET | Refills: 0 | Status: SHIPPED | OUTPATIENT
Start: 2024-08-21

## 2024-08-29 ENCOUNTER — VIRTUAL VISIT (OUTPATIENT)
Dept: DERMATOLOGY | Facility: CLINIC | Age: 34
End: 2024-08-29
Payer: COMMERCIAL

## 2024-08-29 DIAGNOSIS — L73.2 HIDRADENITIS SUPPURATIVA: Primary | ICD-10-CM

## 2024-08-29 PROCEDURE — 99442 PR PHYSICIAN TELEPHONE EVALUATION 11-20 MIN: CPT | Mod: 93 | Performed by: DERMATOLOGY

## 2024-08-29 ASSESSMENT — PAIN SCALES - GENERAL: PAINLEVEL: NO PAIN (0)

## 2024-08-29 NOTE — NURSING NOTE
Dermatology Rooming Note    Brendan Magaña's goals for this visit include:   Chief Complaint   Patient presents with    Derm Problem     Stable overall. She will work on sending pictures.     Rina Martinez LPN

## 2024-08-29 NOTE — LETTER
8/29/2024       RE: Brendan Magaña  [address] [place] [address]     Dear Colleague,    Thank you for referring your patient, Brendan Magaña, to the [facility] [place] at [facility]. Please see a copy of my visit note below.    [facility] Dermatology Note  Encounter Date: Aug 29, 2024  Telephone ([phone] ). Location of teledermatologist: [facility] [place]. Start time: 9:47pm. End time: 9:58pm    Dermatology Problem List:  1.  Hidradenitis suppurativa   - current tx: infliximab, clindamycin bid  - prior tx: humira  ____________________________________________    Assessment & Plan:   # Hidradenitis suppurativa.   - Continue clindamycin 300 mg twice a day  - COntinue infliximab every 4 weeks  - s/p  Shingles x1 and pnuemonia vaccines   - Will need second shingles vaccine in future  The longitudinal plan of care for the diagnosis(es)/condition(s) as documented were addressed during this visit. Due to the added complexity in care, I will continue to support Brendan in the subsequent management and with ongoing continuity of care.    # Vitamin Deficiency   - On replacement. Will recheck wityh next infusion    # Zinc deficiency  - On replacement  - Will recheck with next inbfusion    # microcytic anemia  - Will check iron stdiues with next infusion    # low potassium  - Ordered supplement and recheck with infusion    Follow-up: 8 weeks via phone    Lex Xiao MD, FAAD, FACP     Departments of Internal Medicine and Dermatology  [facility]    ____________________________________________    CC:   Chief Complaint   Patient presents with     Derm Problem     Stable overall. She will work on sending pictures.       HPI:  Ms. Brendan Magaña is a(n) 34 year old female who presents today as a return patient for HS  Last seen on 3/28/24 in clinic.    Continued Humira until first  infusion which was 8/2/24 and second infusion on 8/16/24  - completed two infusions of infliximab and is already seeing healing   Clobetasol made it wors eso she stopped that.     Patient is otherwise feeling well, without additional skin concerns.    HS Nurse Assessment        3/28/2024     9:04 AM 8/29/2024     3:19 PM   Nurse Assessment Data   Over the past 30 days how many old lesions flared back up? Always active 4   Over the past 30 days how many new lesions did you get? 0 3   Over the past week, how many dressing changes do you do each day? 3+ 1   Over the past week, has your wound drainage been: Mild Mild   Rate your HS overall from 0 - 10 (0 = no disease, 10 = worst) over the past week:  7-8 3   Rate your pain score from 0 - 10 (0 = no disease, 10 = worst) for the most painful/symptomatic lesion in the past week:  7 3   Over the past week, how much has HS influenced your quality of life? very much moderately       Medications:  Current Outpatient Medications   Medication Sig Dispense Refill     chlorhexidine (BETASEPT SURGICAL SCRUB) 4 % liquid Apply topically daily as needed       clindamycin (CLEOCIN) 300 MG capsule Take 1 capsule (300 mg) by mouth 2 times daily 60 capsule 2     ferrous sulfate (FE TABS) 325 (65 Fe) MG EC tablet Take 325 mg by mouth daily (with breakfast)       HUMIRA *CF* PEN 40 MG/0.4ML pen kit Inject 40 mg subcutaneously every 7 days       levothyroxine (SYNTHROID/LEVOTHROID) 137 MCG tablet Take 137 mcg by mouth every morning (before breakfast)       potassium chloride (KLOR-CON) 20 MEQ packet Take 40 mEq by mouth 2 times daily. 2 packet 0     vitamin D3 (CHOLECALCIFEROL) 50 mcg (2000 units) tablet Take 1 tablet (50 mcg) by mouth daily 90 tablet 3     zinc gluconate 50 MG tablet Take 2 tablets (100 mg) by mouth daily 180 tablet 0     No current facility-administered medications for this visit.      Past Medical/Surgical History:   Patient Active Problem List   Diagnosis      Hidradenitis suppurativa   Exam: Violacdeous plaqies on flank (muiltitunneled plaque) healing with closing of wounds. Reduced drainage.   Back looks much less indurated and uilcerated. Few tunneling draining plaques on lower back. 2 seem open.         Again, thank you for allowing me to participate in the care of your patient.      Sincerely,    Lex Xiao MD

## 2024-08-29 NOTE — PROGRESS NOTES
[facility] Dermatology Note  Encounter Date: Aug 29, 2024  Telephone ([phone] ). Location of teledermatologist: [facility] DERMATOLOGY CLIN[facility] [place]. Start time: 9:47pm. End time: 9:58pm    Dermatology Problem List:  1.  Hidradenitis suppurativa   - current tx: infliximab, clindamycin bid  - prior tx: humira  ____________________________________________    Assessment & Plan:   # Hidradenitis suppurativa.   - Continue clindamycin 300 mg twice a day  - COntinue infliximab every 4 weeks  - s/p  Shingles x1 and pnuemonia vaccines   - Will need second shingles vaccine in future  The longitudinal plan of care for the diagnosis(es)/condition(s) as documented were addressed during this visit. Due to the added complexity in care, I will continue to support Brendan in the subsequent management and with ongoing continuity of care.    # Vitamin Deficiency   - On replacement. Will recheck wityh next infusion    # Zinc deficiency  - On replacement  - Will recheck with next inbfusion    # microcytic anemia  - Will check iron stdiues with next infusion    # low potassium  - Ordered supplement and recheck with infusion    Follow-up: 8 weeks via phone    Lex Xiao MD, FAAD, FACP     Departments of Internal Medicine and Dermatology  [facility]    ____________________________________________    CC:   Chief Complaint   Patient presents with    Derm Problem     Stable overall. She will work on sending pictures.       HPI:  Ms. Brendan Magaña is a(n) 34 year old female who presents today as a return patient for HS  Last seen on 3/28/24 in clinic.    Continued Humira until first infusion which was 8/2/24 and second infusion on 8/16/24  - completed two infusions of infliximab and is already seeing healing   Clobetasol made it wors eso she stopped that.     Patient is otherwise feeling well, without additional skin concerns.    HS Nurse Assessment        3/28/2024     9:04 AM  8/29/2024     3:19 PM   Nurse Assessment Data   Over the past 30 days how many old lesions flared back up? Always active 4   Over the past 30 days how many new lesions did you get? 0 3   Over the past week, how many dressing changes do you do each day? 3+ 1   Over the past week, has your wound drainage been: Mild Mild   Rate your HS overall from 0 - 10 (0 = no disease, 10 = worst) over the past week:  7-8 3   Rate your pain score from 0 - 10 (0 = no disease, 10 = worst) for the most painful/symptomatic lesion in the past week:  7 3   Over the past week, how much has HS influenced your quality of life? very much moderately       Medications:  Current Outpatient Medications   Medication Sig Dispense Refill    chlorhexidine (BETASEPT SURGICAL SCRUB) 4 % liquid Apply topically daily as needed      clindamycin (CLEOCIN) 300 MG capsule Take 1 capsule (300 mg) by mouth 2 times daily 60 capsule 2    ferrous sulfate (FE TABS) 325 (65 Fe) MG EC tablet Take 325 mg by mouth daily (with breakfast)      HUMIRA *CF* PEN 40 MG/0.4ML pen kit Inject 40 mg subcutaneously every 7 days      levothyroxine (SYNTHROID/LEVOTHROID) 137 MCG tablet Take 137 mcg by mouth every morning (before breakfast)      potassium chloride (KLOR-CON) 20 MEQ packet Take 40 mEq by mouth 2 times daily. 2 packet 0    vitamin D3 (CHOLECALCIFEROL) 50 mcg (2000 units) tablet Take 1 tablet (50 mcg) by mouth daily 90 tablet 3    zinc gluconate 50 MG tablet Take 2 tablets (100 mg) by mouth daily 180 tablet 0     No current facility-administered medications for this visit.      Past Medical/Surgical History:   Patient Active Problem List   Diagnosis    Hidradenitis suppurativa   Exam: Violacdeous plaqies on flank (muiltitunneled plaque) healing with closing of wounds. Reduced drainage.   Back looks much less indurated and uilcerated. Few tunneling draining plaques on lower back. 2 seem open.

## 2024-09-13 ENCOUNTER — INFUSION THERAPY VISIT (OUTPATIENT)
Dept: INFUSION THERAPY | Facility: CLINIC | Age: 34
End: 2024-09-13
Attending: DERMATOLOGY
Payer: COMMERCIAL

## 2024-09-13 VITALS
DIASTOLIC BLOOD PRESSURE: 67 MMHG | HEART RATE: 79 BPM | SYSTOLIC BLOOD PRESSURE: 95 MMHG | TEMPERATURE: 98.2 F | RESPIRATION RATE: 16 BRPM | WEIGHT: 196 LBS | OXYGEN SATURATION: 99 %

## 2024-09-13 DIAGNOSIS — L73.2 HIDRADENITIS SUPPURATIVA: Primary | ICD-10-CM

## 2024-09-13 LAB
FERRITIN SERPL-MCNC: 34 NG/ML (ref 6–175)
IRON BINDING CAPACITY (ROCHE): 326 UG/DL (ref 240–430)
IRON SATN MFR SERPL: 12 % (ref 15–46)
IRON SERPL-MCNC: 39 UG/DL (ref 37–145)
POTASSIUM SERPL-SCNC: 3.4 MMOL/L (ref 3.4–5.3)
VIT D+METAB SERPL-MCNC: 35 NG/ML (ref 20–50)

## 2024-09-13 PROCEDURE — 82306 VITAMIN D 25 HYDROXY: CPT

## 2024-09-13 PROCEDURE — 258N000003 HC RX IP 258 OP 636: Performed by: DERMATOLOGY

## 2024-09-13 PROCEDURE — 96413 CHEMO IV INFUSION 1 HR: CPT

## 2024-09-13 PROCEDURE — 82728 ASSAY OF FERRITIN: CPT

## 2024-09-13 PROCEDURE — 83550 IRON BINDING TEST: CPT

## 2024-09-13 PROCEDURE — 36415 COLL VENOUS BLD VENIPUNCTURE: CPT

## 2024-09-13 PROCEDURE — 96415 CHEMO IV INFUSION ADDL HR: CPT

## 2024-09-13 PROCEDURE — 84630 ASSAY OF ZINC: CPT

## 2024-09-13 PROCEDURE — 84132 ASSAY OF SERUM POTASSIUM: CPT

## 2024-09-13 PROCEDURE — 250N000011 HC RX IP 250 OP 636: Performed by: DERMATOLOGY

## 2024-09-13 RX ORDER — HEPARIN SODIUM (PORCINE) LOCK FLUSH IV SOLN 100 UNIT/ML 100 UNIT/ML
5 SOLUTION INTRAVENOUS
OUTPATIENT
Start: 2024-10-11

## 2024-09-13 RX ORDER — METHYLPREDNISOLONE SODIUM SUCCINATE 40 MG/ML
40 INJECTION, POWDER, LYOPHILIZED, FOR SOLUTION INTRAMUSCULAR; INTRAVENOUS ONCE
OUTPATIENT
Start: 2024-10-11

## 2024-09-13 RX ORDER — METHYLPREDNISOLONE SODIUM SUCCINATE 125 MG/2ML
125 INJECTION, POWDER, LYOPHILIZED, FOR SOLUTION INTRAMUSCULAR; INTRAVENOUS
Start: 2024-10-11

## 2024-09-13 RX ORDER — EPINEPHRINE 1 MG/ML
0.3 INJECTION, SOLUTION INTRAMUSCULAR; SUBCUTANEOUS EVERY 5 MIN PRN
OUTPATIENT
Start: 2024-10-11

## 2024-09-13 RX ORDER — HEPARIN SODIUM,PORCINE 10 UNIT/ML
5-20 VIAL (ML) INTRAVENOUS DAILY PRN
OUTPATIENT
Start: 2024-10-11

## 2024-09-13 RX ORDER — DIPHENHYDRAMINE HCL 25 MG
25 CAPSULE ORAL ONCE
Start: 2024-10-11 | End: 2024-10-11

## 2024-09-13 RX ORDER — DIPHENHYDRAMINE HYDROCHLORIDE 50 MG/ML
50 INJECTION INTRAMUSCULAR; INTRAVENOUS
Start: 2024-10-11

## 2024-09-13 RX ORDER — ALBUTEROL SULFATE 0.83 MG/ML
2.5 SOLUTION RESPIRATORY (INHALATION)
OUTPATIENT
Start: 2024-10-11

## 2024-09-13 RX ORDER — ACETAMINOPHEN 325 MG/1
650 TABLET ORAL ONCE
Start: 2024-10-11 | End: 2024-10-11

## 2024-09-13 RX ORDER — ALBUTEROL SULFATE 90 UG/1
1-2 AEROSOL, METERED RESPIRATORY (INHALATION)
Start: 2024-10-11

## 2024-09-13 RX ADMIN — SODIUM CHLORIDE 900 MG: 9 INJECTION, SOLUTION INTRAVENOUS at 09:49

## 2024-09-13 NOTE — PATIENT INSTRUCTIONS
Dear Brendan Magaña    Thank you for choosing [facility] ([facility] for your infusion.  The following information is a summary of our appointment as well as important reminders.      EDUCATION POST BIOLOGICAL/CHEMOTHERAPY INFUSION  Call the triage nurse at your clinic or seek medical attention if you have chills and/or temperature greater than or equal to 100.5, uncontrolled nausea/vomiting, diarrhea, constipation, dizziness, shortness of breath, chest pain, heart palpitations, weakness or any other new or concerning symptoms, questions or concerns.  You can not have any live virus vaccines prior to or during treatment or up to 6 months post infusion.  If you have an upcoming surgery, medical procedure or dental procedure during treatment, this should be discussed with your ordering physician and your surgeon/dentist.  If you are having any concerning symptom, if you are unsure if you should get your next infusion or wish to speak to a provider before your next infusion, please call your care coordinator or triage nurse at your clinic to notify them so we can adequately serve you.     If you are a transplant patient and require transplant education, please click on this link: [email or web address].    If you have any questions on your upcoming Specialty Infusion appointments, please call scheduling at [phone].  It was a pleasure taking care of you today.    Sincerely,    [facility]  [facility]  [address] [place] [address]  [address]  Phone:  [phone]

## 2024-09-13 NOTE — PROGRESS NOTES
Infusion Nursing Note:  Brendan Magaña  presents today for Patient presents with:  Infusion: Remicade    .    Patient seen by provider today: No   present during visit today: Not Applicable.    Note: Patient identification verified by name and date of birth.  Assessment completed.  Vitals recorded in Doc Flowsheets.  Patient was provided with education regarding medication/procedure and possible side effects.  Patient verbalized understanding.    During today's [facility] appointment, orders from [facility],  were completed.    Note:  Frequency: Q 28 days, today is 3rd dose   Labs: drawn per open orders  Premedications:none  Infusion Rate/Length: Remicade  infused at 137 mL/hr. Total infusion time of approximately 2 hours  .    Administrations This Visit       inFLIXimab-dyyb (INFLECTRA) 900 mg in sodium chloride 0.9 % 275 mL infusion       Admin Date  09/13/2024 Action  $New Bag Dose  900 mg Rate  137.5 mL/hr Route  Intravenous Documented By  Adrianne Balbuena, RN                    Intravenous Access:  Labs drawn without difficulty.  Peripheral IV placed.    Treatment Conditions:  Biological Infusion Checklist:  ~~~ NOTE: If the patient answers yes to any of the questions below, hold the infusion and contact ordering provider or on-call provider.    Have you recently had an elevated temperature, fever, chills, productive cough, coughing for 3 weeks or longer or hemoptysis,  abnormal vital signs, night sweats,  chest pain or have you noticed a decrease in your appetite, unexplained weight loss or fatigue? No  Do you have any open wounds or new incisions? No  Do you have any upcoming hospitalizations or surgeries? Does not include esophagogastroduodenoscopy, colonoscopy, endoscopic retrograde cholangiopancreatography (ERCP), endoscopic ultrasound (EUS), dental procedures or joint aspiration/steroid injections No  Do you currently have any signs of illness or infection or  are you on any antibiotics? No  Have you had any new, sudden or worsening abdominal pain? No  Have you or anyone in your household received a live vaccination in the past 4 weeks? Please note: No live vaccines while on biologic/chemotherapy until 6 months after the last treatment. Patient can receive the flu vaccine (shot only), pneumovax and the Covid vaccine. It is optimal for the patient to get these vaccines mid cycle, but they can be given at any time as long as it is not on the day of the infusion. No  Have you recently been diagnosed with any new nervous system diseases (ie. Multiple sclerosis, Guillain [place], seizures, neurological changes) or cancer diagnosis? Are you on any form of radiation or chemotherapy? No  Are you pregnant or breast feeding or do you have plans of pregnancy in the future? No  Have there been any other new onset medical symptoms? No    POST-INFUSION OF BIOLOGICAL MEDICATION:     Reviewed with patient.  Given biologic medication or medication hand-out. Inform patient if any fever, chills or signs of infection, new symptoms, abdominal pain, heart palpitations, shortness of breath, reaction, weakness, neurological changes, seek medical attention immediately and should not receive infusions. No live virus vaccines prior to or during treatment or up to 6 months post infusion. If the patient has an upcoming procedure or surgery, this should be discussed with the rheumatologist and surgeon or provider.    Post Infusion Assessment:  Patient tolerated infusion without incident.  Blood return noted pre and post infusion.  Site patent and intact, free from redness, edema or discomfort.  No evidence of extravasations.  Access discontinued per protocol.         Discharge Plan:   Discharge instructions reviewed with: Patient.  Patient and/or family verbalized understanding of discharge instructions and all questions answered.  AVS to patient via [facility].  Patient will return   Future Appointments    Date Time Provider Department Center   11/8/2024 10:00 AM [facility]      for next appointment.   Patient discharged in stable condition accompanied by: self.  Departure Mode: Ambulatory.    /69   Pulse 78   Temp 98.2  F (36.8  C) (Oral)   Resp 16   Wt 88.9 kg (196 lb)   SpO2 100%     Adrianne Balbuena RN on 9/13/2024 at 9:00 AM

## 2024-09-15 LAB — ZINC SERPL-MCNC: 193.8 UG/DL

## 2024-10-11 ENCOUNTER — INFUSION THERAPY VISIT (OUTPATIENT)
Dept: INFUSION THERAPY | Facility: CLINIC | Age: 34
End: 2024-10-11
Attending: DERMATOLOGY
Payer: COMMERCIAL

## 2024-10-11 VITALS
HEART RATE: 80 BPM | RESPIRATION RATE: 16 BRPM | SYSTOLIC BLOOD PRESSURE: 112 MMHG | TEMPERATURE: 98.1 F | WEIGHT: 203.9 LBS | DIASTOLIC BLOOD PRESSURE: 75 MMHG | OXYGEN SATURATION: 99 %

## 2024-10-11 DIAGNOSIS — L73.2 HIDRADENITIS SUPPURATIVA: Primary | ICD-10-CM

## 2024-10-11 PROCEDURE — 96413 CHEMO IV INFUSION 1 HR: CPT

## 2024-10-11 PROCEDURE — 258N000003 HC RX IP 258 OP 636: Performed by: DERMATOLOGY

## 2024-10-11 PROCEDURE — 250N000011 HC RX IP 250 OP 636: Mod: JZ | Performed by: DERMATOLOGY

## 2024-10-11 RX ORDER — DIPHENHYDRAMINE HYDROCHLORIDE 50 MG/ML
50 INJECTION INTRAMUSCULAR; INTRAVENOUS
Status: CANCELLED
Start: 2024-11-08

## 2024-10-11 RX ORDER — METHYLPREDNISOLONE SODIUM SUCCINATE 40 MG/ML
40 INJECTION INTRAMUSCULAR; INTRAVENOUS ONCE
Status: CANCELLED | OUTPATIENT
Start: 2024-11-08

## 2024-10-11 RX ORDER — DIPHENHYDRAMINE HCL 25 MG
25 CAPSULE ORAL ONCE
Status: CANCELLED
Start: 2024-11-08 | End: 2024-11-08

## 2024-10-11 RX ORDER — METHYLPREDNISOLONE SODIUM SUCCINATE 125 MG/2ML
125 INJECTION INTRAMUSCULAR; INTRAVENOUS
Status: CANCELLED
Start: 2024-11-08

## 2024-10-11 RX ORDER — HEPARIN SODIUM,PORCINE 10 UNIT/ML
5-20 VIAL (ML) INTRAVENOUS DAILY PRN
Status: CANCELLED | OUTPATIENT
Start: 2024-11-08

## 2024-10-11 RX ORDER — ALBUTEROL SULFATE 90 UG/1
1-2 INHALANT RESPIRATORY (INHALATION)
Status: CANCELLED
Start: 2024-11-08

## 2024-10-11 RX ORDER — EPINEPHRINE 1 MG/ML
0.3 INJECTION, SOLUTION INTRAMUSCULAR; SUBCUTANEOUS EVERY 5 MIN PRN
Status: CANCELLED | OUTPATIENT
Start: 2024-11-08

## 2024-10-11 RX ORDER — ALBUTEROL SULFATE 0.83 MG/ML
2.5 SOLUTION RESPIRATORY (INHALATION)
Status: CANCELLED | OUTPATIENT
Start: 2024-11-08

## 2024-10-11 RX ORDER — HEPARIN SODIUM (PORCINE) LOCK FLUSH IV SOLN 100 UNIT/ML 100 UNIT/ML
5 SOLUTION INTRAVENOUS
Status: CANCELLED | OUTPATIENT
Start: 2024-11-08

## 2024-10-11 RX ORDER — ACETAMINOPHEN 325 MG/1
650 TABLET ORAL ONCE
Status: CANCELLED
Start: 2024-11-08 | End: 2024-11-08

## 2024-10-11 RX ADMIN — SODIUM CHLORIDE 900 MG: 9 INJECTION, SOLUTION INTRAVENOUS at 10:20

## 2024-10-11 ASSESSMENT — PAIN SCALES - GENERAL: PAINLEVEL: NO PAIN (0)

## 2024-10-11 NOTE — PROGRESS NOTES
Chief Complaint   Patient presents with    Infusion     IV Inflectra     Infusion Nursing Note:  Brendan Magaña presents today for IV Inflectra, dose 4.    Patient seen by provider today: No   present during visit today: Not Applicable.    Note: Inflectra infused over 1 hour .      Intravenous Access:  Peripheral IV placed.  No labs due.    Treatment Conditions:  Biological Infusion Checklist:  ~~~ NOTE: If the patient answers yes to any of the questions below, hold the infusion and contact ordering provider or on-call provider.    Have you recently had an elevated temperature, fever, chills, productive cough, coughing for 3 weeks or longer or hemoptysis,  abnormal vital signs, night sweats,  chest pain or have you noticed a decrease in your appetite, unexplained weight loss or fatigue? No  Do you have any open wounds or new incisions? No  Do you have any upcoming hospitalizations or surgeries? Does not include esophagogastroduodenoscopy, colonoscopy, endoscopic retrograde cholangiopancreatography (ERCP), endoscopic ultrasound (EUS), dental procedures or joint aspiration/steroid injections No  Do you currently have any signs of illness or infection or are you on any antibiotics? No  Have you had any new, sudden or worsening abdominal pain? No  Have you or anyone in your household received a live vaccination in the past 4 weeks? Please note: No live vaccines while on biologic/chemotherapy until 6 months after the last treatment. Patient can receive the flu vaccine (shot only), pneumovax and the Covid vaccine. It is optimal for the patient to get these vaccines mid cycle, but they can be given at any time as long as it is not on the day of the infusion. No  Have you recently been diagnosed with any new nervous system diseases (ie. Multiple sclerosis, Guillain [place], seizures, neurological changes) or cancer diagnosis? Are you on any form of radiation or chemotherapy? No  Are you pregnant or breast feeding or do  you have plans of pregnancy in the future? No  Have you been having any signs of worsening depression or suicidal ideations?  (benlysta only) No  Have there been any other new onset medical symptoms? No  Have you had any new blood clots? (IVIG only) No      Post Infusion Assessment:  Patient tolerated infusion without incident.  Blood return noted pre and post infusion.  Site patent and intact, free from redness, edema or discomfort.  No evidence of extravasations.  Access discontinued per protocol.     POST-INFUSION OF BIOLOGICAL MEDICATION:  Reviewed with patient.  Given biologic medication or medication hand-out. Inform patient if any fever, chills or signs of infection, new symptoms, abdominal pain, heart palpitations, shortness of breath, reaction, weakness, neurological changes, seek medical attention immediately and should not receive infusions. No live virus vaccines prior to or during treatment or up to 6 months post infusion. If the patient has an upcoming procedure or surgery, this should be discussed with the rheumatologist and surgeon or provider.      Discharge Plan:   AVS to patient via MYCHART.  Patient will go to [facility] infusion 11/8  for next appt.  Patient discharged in stable condition accompanied by: .  Departure Mode: Ambulatory.    Administrations This Visit       inFLIXimab-dyyb (INFLECTRA) 900 mg in sodium chloride 0.9 % 275 mL infusion       Admin Date  10/11/2024 Action  $New Bag Dose  900 mg Rate  275 mL/hr Route  Intravenous Documented By  Dung Sheehan RN                    Vital signs:  Temp: 98.1  F (36.7  C) Temp src: Oral BP: 105/74 Pulse: 78     SpO2: 99 %       Weight: 92.5 kg (203 lb 14.4 oz)  There is no height or weight on file to calculate BMI.

## 2024-10-11 NOTE — PATIENT INSTRUCTIONS
Dear Brendan Magaña    Thank you for choosing [facility] ([facility] for your infusion.  The following information is a summary of our appointment as well as important reminders.      EDUCATION POST BIOLOGICAL/CHEMOTHERAPY INFUSION  Call the triage nurse at your clinic or seek medical attention if you have chills and/or temperature greater than or equal to 100.5, uncontrolled nausea/vomiting, diarrhea, constipation, dizziness, shortness of breath, chest pain, heart palpitations, weakness or any other new or concerning symptoms, questions or concerns.  You can not have any live virus vaccines prior to or during treatment or up to 6 months post infusion.  If you have an upcoming surgery, medical procedure or dental procedure during treatment, this should be discussed with your ordering physician and your surgeon/dentist.  If you are having any concerning symptom, if you are unsure if you should get your next infusion or wish to speak to a provider before your next infusion, please call your care coordinator or triage nurse at your clinic to notify them so we can adequately serve you.     If you have any questions on your upcoming Specialty Infusion appointments, please call scheduling at [phone].  It was a pleasure taking care of you today.    Sincerely,    [facility]  [facility]  [address] [place]  [address]  Phone:  [phone]

## 2024-10-29 RX ORDER — DIPHENHYDRAMINE HYDROCHLORIDE 50 MG/ML
50 INJECTION INTRAMUSCULAR; INTRAVENOUS
Status: CANCELLED
Start: 2024-10-29

## 2024-10-29 RX ORDER — METHYLPREDNISOLONE SODIUM SUCCINATE 40 MG/ML
40 INJECTION INTRAMUSCULAR; INTRAVENOUS
Status: CANCELLED
Start: 2024-10-29

## 2024-10-29 RX ORDER — ALBUTEROL SULFATE 90 UG/1
1-2 INHALANT RESPIRATORY (INHALATION)
Status: CANCELLED
Start: 2024-10-29

## 2024-10-29 RX ORDER — EPINEPHRINE 1 MG/ML
0.3 INJECTION, SOLUTION INTRAMUSCULAR; SUBCUTANEOUS EVERY 5 MIN PRN
Status: CANCELLED | OUTPATIENT
Start: 2024-10-29

## 2024-10-29 RX ORDER — ALBUTEROL SULFATE 0.83 MG/ML
2.5 SOLUTION RESPIRATORY (INHALATION)
Status: CANCELLED | OUTPATIENT
Start: 2024-10-29

## 2024-10-29 RX ORDER — MEPERIDINE HYDROCHLORIDE 25 MG/ML
25 INJECTION INTRAMUSCULAR; INTRAVENOUS; SUBCUTANEOUS
Status: CANCELLED | OUTPATIENT
Start: 2024-10-29

## 2024-10-29 RX ORDER — DIPHENHYDRAMINE HYDROCHLORIDE 50 MG/ML
25 INJECTION INTRAMUSCULAR; INTRAVENOUS
Status: CANCELLED
Start: 2024-10-29

## 2024-11-08 ENCOUNTER — INFUSION THERAPY VISIT (OUTPATIENT)
Dept: INFUSION THERAPY | Facility: CLINIC | Age: 34
End: 2024-11-08
Attending: DERMATOLOGY
Payer: COMMERCIAL

## 2024-11-08 VITALS
HEART RATE: 93 BPM | OXYGEN SATURATION: 98 % | SYSTOLIC BLOOD PRESSURE: 107 MMHG | WEIGHT: 204.8 LBS | TEMPERATURE: 98.2 F | DIASTOLIC BLOOD PRESSURE: 73 MMHG | RESPIRATION RATE: 16 BRPM

## 2024-11-08 DIAGNOSIS — L73.2 HIDRADENITIS SUPPURATIVA: Primary | ICD-10-CM

## 2024-11-08 PROCEDURE — 99207 PR NO CHARGE LOS: CPT

## 2024-11-08 PROCEDURE — 96413 CHEMO IV INFUSION 1 HR: CPT

## 2024-11-08 PROCEDURE — 250N000011 HC RX IP 250 OP 636: Mod: JZ | Performed by: DERMATOLOGY

## 2024-11-08 PROCEDURE — 258N000003 HC RX IP 258 OP 636: Performed by: DERMATOLOGY

## 2024-11-08 RX ORDER — METHYLPREDNISOLONE SODIUM SUCCINATE 40 MG/ML
40 INJECTION INTRAMUSCULAR; INTRAVENOUS ONCE
OUTPATIENT
Start: 2024-12-06

## 2024-11-08 RX ORDER — ALBUTEROL SULFATE 0.83 MG/ML
2.5 SOLUTION RESPIRATORY (INHALATION)
OUTPATIENT
Start: 2024-12-06

## 2024-11-08 RX ORDER — DIPHENHYDRAMINE HCL 25 MG
25 CAPSULE ORAL ONCE
Start: 2024-12-06 | End: 2024-12-06

## 2024-11-08 RX ORDER — ALBUTEROL SULFATE 90 UG/1
1-2 INHALANT RESPIRATORY (INHALATION)
Start: 2024-12-06

## 2024-11-08 RX ORDER — DIPHENHYDRAMINE HYDROCHLORIDE 50 MG/ML
25 INJECTION INTRAMUSCULAR; INTRAVENOUS
Start: 2024-12-06

## 2024-11-08 RX ORDER — MEPERIDINE HYDROCHLORIDE 25 MG/ML
25 INJECTION INTRAMUSCULAR; INTRAVENOUS; SUBCUTANEOUS
OUTPATIENT
Start: 2024-12-06

## 2024-11-08 RX ORDER — DIPHENHYDRAMINE HYDROCHLORIDE 50 MG/ML
50 INJECTION INTRAMUSCULAR; INTRAVENOUS
Start: 2024-12-06

## 2024-11-08 RX ORDER — ACETAMINOPHEN 325 MG/1
650 TABLET ORAL ONCE
Start: 2024-12-06 | End: 2024-12-06

## 2024-11-08 RX ORDER — EPINEPHRINE 1 MG/ML
0.3 INJECTION, SOLUTION INTRAMUSCULAR; SUBCUTANEOUS EVERY 5 MIN PRN
OUTPATIENT
Start: 2024-12-06

## 2024-11-08 RX ORDER — METHYLPREDNISOLONE SODIUM SUCCINATE 40 MG/ML
40 INJECTION INTRAMUSCULAR; INTRAVENOUS
Start: 2024-12-06

## 2024-11-08 RX ORDER — HEPARIN SODIUM,PORCINE 10 UNIT/ML
5-20 VIAL (ML) INTRAVENOUS DAILY PRN
OUTPATIENT
Start: 2024-12-06

## 2024-11-08 RX ORDER — HEPARIN SODIUM (PORCINE) LOCK FLUSH IV SOLN 100 UNIT/ML 100 UNIT/ML
5 SOLUTION INTRAVENOUS
OUTPATIENT
Start: 2024-12-06

## 2024-11-08 RX ADMIN — SODIUM CHLORIDE 900 MG: 9 INJECTION, SOLUTION INTRAVENOUS at 10:45

## 2024-11-08 NOTE — PROGRESS NOTES
Infusion Nursing Note:  Brendan Magaña presents today for Rapid Infliximab .    Patient seen by provider today: No   present during visit today: Not Applicable.    Note: This is the pts first time to [facility]. Orientation provided to infusion center, pt also instructed on how and when to use his call light. Questions answered to pt satisfaction.    Intravenous Access:  Peripheral IV placed.    Treatment Conditions:  Biological Infusion Checklist:  ~~~ NOTE: If the patient answers yes to any of the questions below, hold the infusion and contact ordering provider or on-call provider.    Have you recently had an elevated temperature, fever, chills, productive cough, coughing for 3 weeks or longer or hemoptysis,  abnormal vital signs, night sweats,  chest pain or have you noticed a decrease in your appetite, unexplained weight loss or fatigue? No  Do you have any open wounds or new incisions? No  Do you have any upcoming hospitalizations or surgeries? Does not include esophagogastroduodenoscopy, colonoscopy, endoscopic retrograde cholangiopancreatography (ERCP), endoscopic ultrasound (EUS), dental procedures or joint aspiration/steroid injections No  Do you currently have any signs of illness or infection or are you on any antibiotics? No  Have you had any new, sudden or worsening abdominal pain? No  Have you or anyone in your household received a live vaccination in the past 4 weeks? Please note: No live vaccines while on biologic/chemotherapy until 6 months after the last treatment. Patient can receive the flu vaccine (shot only), pneumovax and the Covid vaccine. It is optimal for the patient to get these vaccines mid cycle, but they can be given at any time as long as it is not on the day of the infusion. No  Have you recently been diagnosed with any new nervous system diseases (ie. Multiple sclerosis, Guillain [place], seizures, neurological changes) or cancer diagnosis? Are you on any form of  radiation or chemotherapy? No  Have there been any other new onset medical symptoms? No    Post Infusion Assessment:  Patient tolerated infusion without incident.  Blood return noted pre and post infusion.  Site patent and intact, free from redness, edema or discomfort.  No evidence of extravasations.  Access discontinued per protocol.  Biologic Infusion Post Education: Call the triage nurse at your clinic or seek medical attention if you have chills and/or temperature greater than or equal to 100.5, uncontrolled nausea/vomiting, diarrhea, constipation, dizziness, shortness of breath, chest pain, heart palpitations, weakness or any other new or concerning symptoms, questions or concerns.  You cannot have any live virus vaccines prior to or during treatment or up to 6 months post infusion.  If you have an upcoming surgery, medical procedure or dental procedure during treatment, this should be discussed with your ordering physician and your surgeon/dentist.  If you are having any concerning symptom, if you are unsure if you should get your next infusion or wish to speak to a provider before your next infusion, please call your care coordinator or triage nurse at your clinic to notify them so we can adequately serve you.     Discharge Plan:   Discharge instructions reviewed with: Patient.  Patient and/or family verbalized understanding of discharge instructions and all questions answered.  Patient discharged in stable condition accompanied by: self.  Departure Mode: Ambulatory.  Future appts have been reviewed and crosschecked with appt note and plan.      Yelena Sin RN

## 2024-12-03 ENCOUNTER — VIRTUAL VISIT (OUTPATIENT)
Dept: PHARMACY | Facility: CLINIC | Age: 34
End: 2024-12-03
Attending: DERMATOLOGY
Payer: COMMERCIAL

## 2024-12-03 DIAGNOSIS — L73.2 HIDRADENITIS SUPPURATIVA: Primary | ICD-10-CM

## 2024-12-03 RX ORDER — RESORCINOL
POWDER (GRAM) MISCELLANEOUS
Qty: 20 G | Refills: 11 | Status: SHIPPED | OUTPATIENT
Start: 2024-12-03

## 2024-12-03 RX ORDER — INFLIXIMAB 100 MG/10ML
10 INJECTION, POWDER, LYOPHILIZED, FOR SOLUTION INTRAVENOUS
Status: SHIPPED
Start: 2024-12-03

## 2024-12-03 NOTE — Clinical Note
She is doing well overall on infliximab 10mg/kg every 4 weeks. Some recurrent flares but feels HS has significantly improved with inflix vs Humira. Ordered resorcinol cream which she wants to try for flares. Also ordered 2nd Shingrix vaccine for her to get. Gail also helped me get her on your schedule for a follow-up on 3/6/25 (3 months from now).   Brenda

## 2024-12-03 NOTE — PROGRESS NOTES
Medication Therapy Management (MTM) Encounter    ASSESSMENT:                            Hidradenitis suppurativa:   Improved with initiation of infliximab around 4 months ago, would benefit from continuing current regimen.  For flares we discussed the option to try compounded resorcinol cream twice daily as needed, patient was in favor of this. Also continues on oral clindamycin as directed by dermatology.  - Pre-biologic labs: previously completed   - Vaccines: Avoid live vaccines. Agrees to receive 2nd Shingrix vaccine. Also due for annual influenza and updated Covid booster    PLAN:                            Continue infliximab 10 mg/kg every 4 weeks   Continue oral clindamycin 1 capsule (300 mg) twice daily.   May try resorcinol 15% cream - apply topically twice daily as needed to HS flares   Prescription sent to [facility] (P#[phone])   Scheduled vaccine appointment at [facility] ([address] [place] [address]) for Friday, Dec 6th at 9:30 am (before infusion appointment)   I sent a prescription order for this to them.   Scheduled for phone follow-up with Dr. Xiao on March 6th 2025    Follow-up: with me (Brenda Carr, [facility]) for an MTM follow up appointment by phone in around 6 months (6/3/25)     SUBJECTIVE/OBJECTIVE:                          Brendan Magaña is a 34 year old female called for a follow-up visit.      Reason for visit: infliximab follow-up     Medication Adherence/Access:   Infliximab coverage:   - Approved by insurance     Hidradenitis suppurativa:   - Infliximab 10 mg/kg IV every 4 weeks (start date: 8/2/24; last infusion: 11/8/24)  - clindamycin 300 mg twice daily (currently taking)   - clindamycin 1% lotion twice daily as needed  - Chlorhexidine 4% wash daily as needed   - Zinc gluconate 100 mg (2 tablets) once daily   No current side effects reported.     Reports HS has improved with switching to infliximab infusions. Still having some flares but  attributes this to irritation caused by wearing certain clothes that rub against skin vs timing of infliximab infusions. Has not noticed that she flares prior to next infusion. Reports current flare on buttocks & thigh with drainage over the past week. Overall feels infliximab is working much better than the Humira.     For flares she has been using clindamycin lotion topically but does not find this to be very effective. Would be interested in trying resorcinol cream to see if this helps speed up healing process of flares.     Affected areas include axilla, thighs, groin, back.    Specialist: Dr. Lex Xiao MD, Dermatology. [place] Stage: II. Last visit on 08/29/24. The following was recommended:   - Continue clindamycin 300 mg twice a day  - Continue infliximab every 4 weeks  - Shingles x1 and pnuemonia vaccines   - Will need second shingles vaccine in future    Previous treatment:   - Humira (adalimumab) 40 mg weekly (12/2023 - 07/2024) - continues to chronically flares  - Metformin: severe GI side effects   - clobetasol 0.05% ointment as needed (inflamed HS lesions, stopped by patient)     Pre-Biologic Screening:   Hep B Surface Antibody Reactive, indicates immunity. (3/28/24)   Hep B Core Antibody  Non-reactive (3/28/24)   Hep B Surface Antigen Non-reactive  (3/28/24)   Hep C Antibody  Non-reactive  (3/28/24)   HIV Antigen Antibody Non-reactive  (5/29/19)     Quantiferon TB Gold Negative  Last checked: 11/16/23  No reported risk factors or symptoms concerning for TB infection.       Immunization History   Covid-19 vaccine (2024-25 version)  Due to receive   Influenza (annual) Due to receive, avoid live FluMist   Pneumococcal  Prevnar-20: 3/28/24 Up-to-date   Tetanus/Tdap  Up-to-date   Shingrix (1st dose: 3/28/24) Due for 2nd dose   All patients on biologics should avoid live vaccines (varicella/VZV, intranasal influenza, MMR, or yellow fever vaccine (if traveling))       Allergies/ADRs: Reviewed in  chart  Past Medical History: Reviewed in chart  Tobacco: She reports that she has never smoked. She has never used smokeless tobacco.  Alcohol: Reviewed in chart    ----------------    I spent 30 minutes with this patient today. All changes were made via collaborative practice agreement with Lex Xiao. A copy of the visit note was provided to the patient's provider(s).    A summary of these recommendations was sent via Unyqe.    Tawanna GibsonD  Medication Therapy Management Pharmacist   [facility]     Telemedicine Visit Details  The patient's medications can be safely assessed via a telemedicine encounter.  Type of service:  Telephone visit  Originating Location (pt. Location): Home    Distant Location (provider location):  Off-site  Start Time:  9:00am  End Time:  9:30am     Medication Therapy Recommendations  No medication therapy recommendations to display

## 2024-12-03 NOTE — PATIENT INSTRUCTIONS
"Recommendations from today's MTM visit:                                                      Continue infliximab 10 mg/kg every 4 weeks   Continue oral clindamycin 1 capsule (300 mg) twice daily.   May try resorcinol 15% cream - apply topically twice daily as needed to HS flares   Prescription sent to [facility] (P#[phone])   Scheduled vaccine appointment at [facility] ([address] [place] [address]) for Friday, Dec 6th at 9:30 am (before infusion appointment)   I sent a prescription order for this to them.   Scheduled for phone follow-up with Dr. Xiao on March 6th 2025    Follow-up: with me (Brenda Carr, [facility]) for an MTM follow up appointment by phone in around 6 months (6/3/25)     It was great speaking with you today.  I value your experience and would be very thankful for your time in providing feedback in our clinic survey. In the next few days, you may receive an email or text message from [facility] with a link to a survey related to your  clinical pharmacist.\"     To schedule another MTM appointment, please call the clinic directly or you may call the MTM scheduling line at [phone] or toll-free at [phone].     My Clinical Pharmacist's contact information:                                                      Please feel free to contact me with any questions or concerns you have.      Brenda Carr, PharmD  MTM Pharmacist  [facility]   "

## 2024-12-17 DIAGNOSIS — L73.2 HIDRADENITIS SUPPURATIVA: ICD-10-CM

## 2024-12-22 RX ORDER — CLINDAMYCIN HYDROCHLORIDE 300 MG/1
300 CAPSULE ORAL 2 TIMES DAILY
Qty: 60 CAPSULE | Refills: 2 | Status: SHIPPED | OUTPATIENT
Start: 2024-12-22

## 2024-12-22 NOTE — TELEPHONE ENCOUNTER
"clindamycin (CLEOCIN) 300 MG capsule   Disp-60 capsule, R-2   Start: 07/15/2024     8/29/2024  [facility] [place]    Lex Xiao MD  Dermatology    \"Continue clindamycin 300 mg twice a day \"    Follow-up: 8 weeks via phone    Nv: 3/5/24             "

## 2025-01-11 ENCOUNTER — HEALTH MAINTENANCE LETTER (OUTPATIENT)
Age: 35
End: 2025-01-11

## 2025-03-12 ENCOUNTER — ANCILLARY PROCEDURE (OUTPATIENT)
Dept: ULTRASOUND IMAGING | Facility: CLINIC | Age: 35
End: 2025-03-12
Attending: DERMATOLOGY
Payer: COMMERCIAL

## 2025-03-12 ENCOUNTER — LAB (OUTPATIENT)
Dept: LAB | Facility: CLINIC | Age: 35
End: 2025-03-12
Payer: COMMERCIAL

## 2025-03-12 DIAGNOSIS — R79.89 ELEVATED LFTS: ICD-10-CM

## 2025-03-12 DIAGNOSIS — L73.2 HIDRADENITIS SUPPURATIVA: ICD-10-CM

## 2025-03-12 LAB
ALBUMIN SERPL BCG-MCNC: 4.3 G/DL (ref 3.5–5.2)
ALP SERPL-CCNC: 78 U/L (ref 40–150)
ALT SERPL W P-5'-P-CCNC: 24 U/L (ref 0–50)
AST SERPL W P-5'-P-CCNC: 23 U/L (ref 0–45)
BILIRUB DIRECT SERPL-MCNC: 0.54 MG/DL (ref 0–0.3)
BILIRUB SERPL-MCNC: 1.3 MG/DL
EST. AVERAGE GLUCOSE BLD GHB EST-MCNC: 100 MG/DL
HAV AB SER QL IA: NONREACTIVE
HBA1C MFR BLD: 5.1 % (ref 0–5.6)
HBV CORE AB SERPL QL IA: NONREACTIVE
HBV SURFACE AB SERPL IA-ACNC: 16.8 M[IU]/ML
HBV SURFACE AB SERPL IA-ACNC: REACTIVE M[IU]/ML
HBV SURFACE AG SERPL QL IA: NONREACTIVE
HCV AB SERPL QL IA: NONREACTIVE
PROT SERPL-MCNC: 8.4 G/DL (ref 6.4–8.3)
RADIOLOGIST FLAGS: NORMAL

## 2025-03-12 PROCEDURE — 76705 ECHO EXAM OF ABDOMEN: CPT | Performed by: RADIOLOGY

## 2025-03-15 LAB — MITOCHONDRIA M2 IGG SER-ACNC: 3.4 U/ML

## 2025-03-26 DIAGNOSIS — L73.2 HIDRADENITIS SUPPURATIVA: Primary | ICD-10-CM

## 2025-03-27 ENCOUNTER — TELEPHONE (OUTPATIENT)
Dept: DERMATOLOGY | Facility: CLINIC | Age: 35
End: 2025-03-27
Payer: COMMERCIAL

## 2025-03-27 DIAGNOSIS — L73.2 HIDRADENITIS SUPPURATIVA: Primary | ICD-10-CM

## 2025-03-27 NOTE — TELEPHONE ENCOUNTER
Received Epic chat from infusion center requesting additional orders for Inflectra be added to the therapy plan. Last plan with Dr. Xiao was to continue infliximab 10 mg/kg every 4 weeks.     Brenda Carr, PharmD  MTM Pharmacist

## 2025-04-25 ENCOUNTER — TELEPHONE (OUTPATIENT)
Dept: DERMATOLOGY | Facility: CLINIC | Age: 35
End: 2025-04-25

## 2025-04-25 NOTE — TELEPHONE ENCOUNTER
Health Call Center    Phone Message    May a detailed message be left on voicemail: yes     Reason for Call: Other: [place] infusion would like to discuss adjusting length of infusion for pt. Please review and discuss with infusion team RN.      Action Taken: TE SENT    Travel Screening: Not Applicable     Date of Service:                        Thank you!  [facility]

## 2025-04-25 NOTE — TELEPHONE ENCOUNTER
Infusion Nursing Note:4/25/25  Brendan Magaña presents today for Inflectra.    Patient seen by provider today: No   present during visit today: Not Applicable.     Note: Patient arrives today via ambulatory for her infliximab rapid infusion.  Patient is alert and oriented x 4, VSS.  Patient states she feels well today and does not receive pre-meds for this drug.  Labs not due today.  Patient states she has received this drug over one hour in the past.  It appears the therapy plan was updated in March and reverted back to a 120 minute infusion.  Message sent to Dr. Xiao to adjust the plan.  Writer advised that until this is changed in her plan, I have to follow current directions for 120 minutes.  Patient verbalized understanding.  Medication administered per protocol.

## 2025-06-04 ENCOUNTER — TELEPHONE (OUTPATIENT)
Dept: PHARMACY | Facility: CLINIC | Age: 35
End: 2025-06-04
Payer: MEDICARE

## 2025-06-04 NOTE — TELEPHONE ENCOUNTER
Pt left voicemail in response to MyC message sent this morning. Called pt back to initiate scheduling    Outcome: DELILAH

## 2025-06-09 ENCOUNTER — VIRTUAL VISIT (OUTPATIENT)
Dept: PHARMACY | Facility: CLINIC | Age: 35
End: 2025-06-09
Attending: DERMATOLOGY
Payer: COMMERCIAL

## 2025-06-09 DIAGNOSIS — L73.2 HIDRADENITIS SUPPURATIVA: ICD-10-CM

## 2025-06-09 DIAGNOSIS — Z79.620 ENCOUNTER FOR MONITORING OF INFLIXIMAB THERAPY: Primary | ICD-10-CM

## 2025-06-09 DIAGNOSIS — Z51.81 ENCOUNTER FOR MONITORING OF INFLIXIMAB THERAPY: Primary | ICD-10-CM

## 2025-06-09 RX ORDER — CLINDAMYCIN HYDROCHLORIDE 300 MG/1
300 CAPSULE ORAL 2 TIMES DAILY
Qty: 180 CAPSULE | Refills: 2 | Status: SHIPPED | OUTPATIENT
Start: 2025-06-09

## 2025-06-09 RX ORDER — CLINDAMYCIN PHOSPHATE 10 MG/G
GEL TOPICAL 2 TIMES DAILY PRN
Qty: 60 G | Refills: 4 | Status: SHIPPED | OUTPATIENT
Start: 2025-06-09

## 2025-06-09 RX ORDER — RESORCINOL
POWDER (GRAM) MISCELLANEOUS
Qty: 30 G | Refills: 5 | Status: SHIPPED | OUTPATIENT
Start: 2025-06-09

## 2025-06-09 NOTE — PROGRESS NOTES
Medication Therapy Management (MTM) Encounter    ASSESSMENT:                            Hidradenitis suppurativa:   Overall improved on infliximab but continues to experience chronic flares of old lesions. Recommended we check patient's infliximab level prior to next infusion on 6/20. If levels are low, may consider increasing dose. If levels appropriate, may consider trial of IL-17 inhibitor (Bimzelx or Cosentyx). Reviewed she may use resorcinol ointment as needed for flares.  Refilled oral clindamycin and topical clindamycin as requested.     Pre-biologic labs: previously completed   Vaccines: Avoid live vaccines. Completed Shingrix vaccine series. Reviewed she is due for updated Covid booster and 2 additional doses for Hepatitis B series as directed by primary care provider.     Anxiety/Depression/Insomnia:   Improved with resuming duloxetine and trazodone.     Hypothyroidism:   TSH within normal limits with last labs; continue levothyroxine.     Vitamin D Deficiency/Iron Deficiency Anemia:   Stable.     PLAN:                            Continue infliximab 10 mg/kg every 4 weeks   Added on infliximab level to check prior to infusion on 06/20/25.   If dose is adjusted will verify it's ordered for 1 hour infusion (per patient preference)     Continue oral clindamycin 1 capsule (300 mg) twice daily.     May try resorcinol 15% cream - apply topically twice daily as needed to HS flares   Prescription available at [facility] (P#[phone])     Sent refills of clindamycin 1% lotion twice daily as needed     Follow-up: with me (Brenda Carr [facility]) for an MTM follow up appointment by phone in around 3 months (9/9/25) if therapy plan is modified by dermatology.     SUBJECTIVE/OBJECTIVE:                          Brendan Magaña is a 35 year old female called for a follow-up visit.      Reason for visit: infliximab follow-up     Medication Adherence/Access:   Infliximab coverage:   - Approved by insurance   - Receives at   [facility] in [place]     Hidradenitis suppurativa:   - Infliximab 10 mg/kg IV every 4 weeks (start date: 8/2/24; last infusion: 11/8/24)  - clindamycin 1% lotion twice daily as needed (requests refill)   - clindamycin 300 mg twice daily (currently taking; requests refill)   - Chlorhexidine 4% wash daily as needed   - Resorcinol 15% ointment twice daily as needed for flares  No current side effects reported. Prefers 1 hour infusions for infliximab - no reaction concerns.      Last infusion 5/23/25. Reports she is still having flares in old lesions ; no new lesions have developed. Areas on neck flaring right now - also affects back, inframammary folds, groin, inner thighs, axilla. Warmer weather & areas with skin to skin contact trigger flares.     Specialist: Dr. Lex Xiao MD, Dermatology. Last visit on 08/29/24.     Previous treatment:   - Humira (adalimumab) 40 mg weekly (12/2023 - 07/2024) - continues to chronically flares  - Metformin: severe GI side effects   - clobetasol 0.05% ointment as needed (inflamed HS lesions, stopped by patient)     Pre-Biologic Screening:   Hep B Surface Antibody Reactive, indicates immunity. (3/28/24)   Hep B Core Antibody  Non-reactive (3/28/24)   Hep B Surface Antigen Non-reactive  (3/28/24)   Hep C Antibody  Non-reactive  (3/28/24)   HIV Antigen Antibody Non-reactive  (5/29/19)     Quantiferon TB Gold Negative  Last checked: 11/16/23  No reported risk factors or symptoms concerning for TB infection.       Immunization History   Covid-19 vaccine (2024-25 version)  Due to receive   Hepatitis B (1st dose: 4/25/25)  Due for 2nd of 3 doses   Influenza (annual) Consider vaccine in 2025-26 season, avoid live FluMist   Pneumococcal  Prevnar-20: 3/28/24 Up-to-date   Tetanus/Tdap  Up-to-date   Shingrix (1st dose: 3/28/24, 2nd dose: 12/6/24) Complete   All patients on biologics should avoid live vaccines (varicella/VZV, intranasal influenza, MMR, or yellow fever vaccine (if  traveling))       Anxiety/Depression/Insomnia:   - Duloxetine 60 mg daily   - Trazodone 50 mg at bedtime  No current side effects    Recently started and has found these are effective     Hypothyroidism:   - Levothyroxine 137 mcg daily  No symptoms reported today.     Vitamin D Deficiency/Iron Deficiency Anemia:   - Vitamin D3 2000 units daily   - Ferrous sulfate 325 mg every other day   No current side effects.     Reports she was directed to resume oral iron supplement     Allergies/ADRs: Reviewed in chart  Past Medical History: Reviewed in chart  Tobacco: She reports that she has never smoked. She has never used smokeless tobacco.  Alcohol: Reviewed in chart    ----------------    I spent 24 minutes with this patient today. All changes were made via collaborative practice agreement with Lex Xiao.     A summary of these recommendations was sent via Curio.    Brenda Carr, PharmD  Medication Therapy Management Pharmacist   [facility]     Telemedicine Visit Details  The patient's medications can be safely assessed via a telemedicine encounter.  Type of service:  Telephone visit  Originating Location (pt. Location): Home    Distant Location (provider location):  Off-site  Start Time: 8:00am  End Time: 8:24am     Medication Therapy Recommendations  No medication therapy recommendations to display

## 2025-06-09 NOTE — PATIENT INSTRUCTIONS
Recommendations from today's MTM visit:                                                      Continue infliximab 10 mg/kg every 4 weeks   Added on infliximab level to check prior to infusion on 06/20/25.   If dose is adjusted will verify it's ordered for 1 hour infusion (per patient preference)     Continue oral clindamycin 1 capsule (300 mg) twice daily.     May try resorcinol 15% cream - apply topically twice daily as needed to HS flares   Prescription available at [facility] (P#[phone])     Sent refills of clindamycin 1% lotion twice daily as needed     Follow-up: with me (Brenda Carr [facility]) for an MTM follow up appointment by phone in around 3 months (9/9/25) if therapy plan is modified by dermatology.     My Clinical Pharmacist's contact information:                                                      Please feel free to contact me with any questions or concerns you have.      Brenda Carr, PharmD  MTM Pharmacist  [facility]

## 2025-06-09 NOTE — Clinical Note
Still having chronic flares of old lesions but no new areas have developed on infliximab 10 mg/kg q 4wks. I added on an infliximab level to check at her next infusion on 6/20/2025.  Patient is on your schedule for this Thursday (6/12/2025). IANI- I suggested she move it out to after the level has been checked but she wanted to keep it as is  If infliximab level is right where it should be we can consider IL-17 inhibitor has not tried in the past  Brenda

## 2025-06-12 ENCOUNTER — VIRTUAL VISIT (OUTPATIENT)
Dept: DERMATOLOGY | Facility: CLINIC | Age: 35
End: 2025-06-12
Payer: MEDICARE

## 2025-06-12 DIAGNOSIS — L73.2 HIDRADENITIS SUPPURATIVA: Primary | ICD-10-CM

## 2025-06-12 DIAGNOSIS — E55.9 VITAMIN D DEFICIENCY: ICD-10-CM

## 2025-06-12 RX ORDER — CHOLECALCIFEROL (VITAMIN D3) 50 MCG
1 TABLET ORAL DAILY
Qty: 90 TABLET | Refills: 3 | Status: SHIPPED | OUTPATIENT
Start: 2025-06-12

## 2025-06-12 ASSESSMENT — PAIN SCALES - GENERAL: PAINLEVEL_OUTOF10: NO PAIN (0)

## 2025-06-12 NOTE — PROGRESS NOTES
[facility] D[facility]at[facility] Note  Encounter Date: Jun 12, 2025  Telephone ([phone]). Location of teledermatologist: [facility] [place]. Start time: 10:02p. End time: 10:25pm    Dermatology Problem List:  1. Hidradenitis suppurativa   - current tx: infliximab, clindamycin bid  - prior tx: humiraRecieve shingles vaccine x2 and poneumonia vaccines    __________________________________    Assessment & Plan:    # HS  - Hidradenitis suppurativa.   - Continue clindamycin 300 mg twice a day  - CHeck levels and increase as needed  - COntinue infliximab every 4 weeks  - Resorcinol cream twice a day as needed  - Consider plastic surgery referral in future.    Follow-up:   - 3 months with Brenda   - 6 months with Dr Mendez      Staff and Scribe:   Lex Xiao MD, FAAD, FACP     Departments of Internal Medicine and Dermatology  [facility]  __________________________________  CC:     Chief Complaint   Patient presents with    Derm Problem     Open areas in thigh, groin, neck       HPI:  Ms. Brendan Magaña is a(n) 35 year old female who presents today as a return patient for HS  Last visit  8/29/24  At that time plan was:     # Hidradenitis suppurativa.   - Continue clindamycin 300 mg twice a day  - COntinue infliximab every 4 weeks    # Vitamin Deficiency   - On replacement. Will recheck wityh next infusion --> recheckj 9 mo ago wnl     # microcytic anemia  - Will check iron stdiues with next infusion   orn studies wnl  2/6/25       She is not getting new leisons but old lesiojns flaree back up. Most problematic lesions are on chest, lower back and b/l arms inner thighs      HS Nurse Assessment        3/28/2024     9:04 AM 8/29/2024     3:19 PM 6/12/2025     2:42 PM   Nurse Assessment Data   Over the past 30 days how many old lesions flared back up? Always active 4 9   Over the past 30 days how many new lesions did you get? 0 3 0   Over the past  week, how many dressing changes do you do each day? 3+ 1 0   Over the past week, has your wound drainage been: Mild Mild Moderate   Rate your HS overall from 0 - 10 (0 = no disease, 10 = worst) over the past week:  7-8 3 7   Rate your pain score from 0 - 10 (0 = no disease, 10 = worst) for the most painful/symptomatic lesion in the past week:  7 3 9   Over the past week, how much has HS influenced your quality of life? very much moderately very much     Medications:  Current Outpatient Medications   Medication Sig Dispense Refill    chlorhexidine (BETASEPT SURGICAL SCRUB) 4 % liquid Apply topically daily as needed      clindamycin (CLEOCIN) 300 MG capsule Take 1 capsule (300 mg) by mouth 2 times daily. 180 capsule 2    clindamycin (CLEOCIN-T) 1 % external gel Apply topically 2 times daily as needed (HS flares). 60 g 4    ferrous sulfate (FE TABS) 325 (65 Fe) MG EC tablet Take 325 mg by mouth daily (with breakfast)      inFLIXimab, REMICADE, 100 MG injection Inject 929 mg into the vein every 28 days. Receives infusions at [facility].      levothyroxine (SYNTHROID/LEVOTHROID) 137 MCG tablet Take 137 mcg by mouth every morning (before breakfast)      Resorcinol POWD Resorcinol 15% in vaseline - apply topically twice daily as needed to flares in HS. 30 g 5    vitamin D3 (CHOLECALCIFEROL) 50 mcg (2000 units) tablet Take 1 tablet (50 mcg) by mouth daily 90 tablet 3     No current facility-administered medications for this visit.      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

## 2025-06-12 NOTE — LETTER
6/12/2025       RE: Brendan Magaña  [address] [place] [address]     Dear Colleague,    Thank you for referring your patient, Brendan Magaña, to the [facility] [place] at [facility]. Please see a copy of my visit note below.    [facility] Dermatology Note  Encounter Date: Jun 12, 2025  Telephone ([phone]). Location of teledermatologist: [facility] [place]. Start time: 10:02p. End time: 10:25pm    Dermatology Problem List:  1. Hidradenitis suppurativa   - current tx: infliximab, clindamycin bid  - prior tx: humiraRecieve shingles vaccine x2 and poneumonia vaccines    __________________________________    Assessment & Plan:    # HS  - Hidradenitis suppurativa.   - Continue clindamycin 300 mg twice a day  - CHeck levels and increase as needed  - COntinue infliximab every 4 weeks  - Resorcinol cream twice a day as needed  - Consider plastic surgery referral in future.    Follow-up:   - 3 months with Brenda   - 6 months with Dr Mendez      Staff and Scribe:   Lex Xiao MD, FAAD, FACP     Departments of Internal Medicine and Dermatology  [facility]  __________________________________  CC:     Chief Complaint   Patient presents with     Derm Problem     Open areas in thigh, groin, neck       HPI:  Ms. Brendan Magaña is a(n) 35 year old female who presents today as a return patient for HS  Last visit  8/29/24  At that time plan was:     # Hidradenitis suppurativa.   - Continue clindamycin 300 mg twice a day  - COntinue infliximab every 4 weeks    # Vitamin Deficiency   - On replacement. Will recheck wityh next infusion --> recheckj 9 mo ago wnl     # microcytic anemia  - Will check iron stdiues with next infusion   orn studies wnl  2/6/25       She is not getting new leisons but old lesiojns flaree back up. Most problematic lesions are on chest, lower back and  b/l arms inner thighs      HS Nurse Assessment        3/28/2024     9:04 AM 8/29/2024     3:19 PM 6/12/2025     2:42 PM   Nurse Assessment Data   Over the past 30 days how many old lesions flared back up? Always active 4 9   Over the past 30 days how many new lesions did you get? 0 3 0   Over the past week, how many dressing changes do you do each day? 3+ 1 0   Over the past week, has your wound drainage been: Mild Mild Moderate   Rate your HS overall from 0 - 10 (0 = no disease, 10 = worst) over the past week:  7-8 3 7   Rate your pain score from 0 - 10 (0 = no disease, 10 = worst) for the most painful/symptomatic lesion in the past week:  7 3 9   Over the past week, how much has HS influenced your quality of life? very much moderately very much     Medications:  Current Outpatient Medications   Medication Sig Dispense Refill     chlorhexidine (BETASEPT SURGICAL SCRUB) 4 % liquid Apply topically daily as needed       clindamycin (CLEOCIN) 300 MG capsule Take 1 capsule (300 mg) by mouth 2 times daily. 180 capsule 2     clindamycin (CLEOCIN-T) 1 % external gel Apply topically 2 times daily as needed (HS flares). 60 g 4     ferrous sulfate (FE TABS) 325 (65 Fe) MG EC tablet Take 325 mg by mouth daily (with breakfast)       inFLIXimab, REMICADE, 100 MG injection Inject 929 mg into the vein every 28 days. Receives infusions at [facility].       levothyroxine (SYNTHROID/LEVOTHROID) 137 MCG tablet Take 137 mcg by mouth every morning (before breakfast)       Resorcinol POWD Resorcinol 15% in vaseline - apply topically twice daily as needed to flares in HS. 30 g 5     vitamin D3 (CHOLECALCIFEROL) 50 mcg (2000 units) tablet Take 1 tablet (50 mcg) by mouth daily 90 tablet 3     No current facility-administered medications for this visit.      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      Sincerely,    Lex Xiao MD

## 2025-06-12 NOTE — NURSING NOTE
Dermatology Rooming Note    Brendan Magaña's goals for this visit include:   Chief Complaint   Patient presents with    Derm Problem     Open areas in thigh, groin, neck     Rina Martinez LPN

## 2025-06-24 ENCOUNTER — TELEPHONE (OUTPATIENT)
Dept: DERMATOLOGY | Facility: CLINIC | Age: 35
End: 2025-06-24
Payer: MEDICARE

## 2025-06-24 NOTE — TELEPHONE ENCOUNTER
6/24 Patient confirmed scheduled appointment:  Date: 12/11/25  Time: 4:30pm  Visit type: Return HS  Provider: Dameon  Location: [facility]  Testing/imaging: na  Additional notes: virtual visit

## 2025-07-24 DIAGNOSIS — L73.2 HIDRADENITIS SUPPURATIVA: Primary | ICD-10-CM
